# Patient Record
Sex: MALE | Race: WHITE | NOT HISPANIC OR LATINO | Employment: OTHER | URBAN - METROPOLITAN AREA
[De-identification: names, ages, dates, MRNs, and addresses within clinical notes are randomized per-mention and may not be internally consistent; named-entity substitution may affect disease eponyms.]

---

## 2021-10-19 ENCOUNTER — OFFICE VISIT (OUTPATIENT)
Dept: NEUROSURGERY | Facility: CLINIC | Age: 66
End: 2021-10-19

## 2021-10-19 VITALS
WEIGHT: 172 LBS | BODY MASS INDEX: 24.62 KG/M2 | SYSTOLIC BLOOD PRESSURE: 117 MMHG | HEART RATE: 61 BPM | DIASTOLIC BLOOD PRESSURE: 59 MMHG | HEIGHT: 70 IN

## 2021-10-19 DIAGNOSIS — M47.816 FACET ARTHRITIS OF LUMBAR REGION: ICD-10-CM

## 2021-10-19 DIAGNOSIS — M51.27 HERNIATION OF INTERVERTEBRAL DISC BETWEEN L5 AND S1: Primary | ICD-10-CM

## 2021-10-19 DIAGNOSIS — M54.42 CHRONIC MIDLINE LOW BACK PAIN WITH LEFT-SIDED SCIATICA: ICD-10-CM

## 2021-10-19 DIAGNOSIS — G89.29 CHRONIC MIDLINE LOW BACK PAIN WITH LEFT-SIDED SCIATICA: ICD-10-CM

## 2021-10-19 PROCEDURE — 99204 OFFICE O/P NEW MOD 45 MIN: CPT | Performed by: PHYSICIAN ASSISTANT

## 2021-10-19 RX ORDER — IBUPROFEN 200 MG
200 TABLET ORAL DAILY PRN
COMMUNITY
End: 2023-03-07

## 2021-10-19 NOTE — PROGRESS NOTES
"Chief Complaint  Back Pain (Low back pain, radiates down left leg)    Subjective          Demetri Pratt who is a 65 y.o. year old male who presents to [facility] for Evaluation of the Spine.     The patient complains of pain located in the Lumbar Spine.  Patients states the pain has been present for 2 months.  The pain came on gradually.  The pain scaled level is 8.  The pain does radiate. Dermatomes are located on left Lumbar at: below the knee and to the outer ankle, but does not go into toes..  The pain is Intermittent and described as sharp and aching.  The pain is worse at no particular time of day. Patient states Rest makes the pain better.  Patient states Prolonged Walking and Lifting makes the pain worse.    Associated Symptoms Include: Weakness, left knee  Conservative Interventions Include: NSAIDs that were not very effective.    Was this the result of an injury or accident?: No    History of Previous Spinal Surgery?: No     reports that he quit smoking about 2 years ago. He has a 75.00 pack-year smoking history. He has never used smokeless tobacco.    Review of Systems   Musculoskeletal: Positive for back pain and myalgias.   Neurological: Positive for numbness.        Objective   Vital Signs:   /59   Pulse 61   Ht 177.8 cm (70\")   Wt 78 kg (172 lb)   BMI 24.68 kg/m²       Physical Exam  Constitutional:       Appearance: Normal appearance. He is normal weight.   Pulmonary:      Effort: Pulmonary effort is normal.   Musculoskeletal:         General: Tenderness (TTP midline at L5-S1 area) present.      Comments: SLR on left caused pain behind knee   Neurological:      General: No focal deficit present.      Mental Status: He is alert and oriented to person, place, and time.      Sensory: No sensory deficit.      Motor: No weakness.      Deep Tendon Reflexes: Reflexes normal.   Psychiatric:         Mood and Affect: Mood normal.         Behavior: " Behavior normal.        Neurologic Exam     Mental Status   Oriented to person, place, and time.        Result Review     I have personally reviewed the lumbar MRI without contrast from September 16, 2021 which shows multilevel degenerative disc disease and facet arthritis worse at the L4-L5 and the L5-S1 disc spaces, there is nerve root compression on the left at L5-S1 causing mass-effect of the exiting left L5 and traversing S1 nerve roots.       Assessment and Plan    Diagnoses and all orders for this visit:    1. Herniation of intervertebral disc between L5 and S1 (Primary)  -     Ambulatory Referral to Physical Therapy Evaluate and treat; Heat, Electrotherapy; E-stim; Stretching (core strength), ROM, Strengthening    2. Facet arthritis of lumbar region  -     Ambulatory Referral to Physical Therapy Evaluate and treat; Heat, Electrotherapy; E-stim; Stretching (core strength), ROM, Strengthening    3. Chronic midline low back pain with left-sided sciatica  -     Ambulatory Referral to Physical Therapy Evaluate and treat; Heat, Electrotherapy; E-stim; Stretching (core strength), ROM, Strengthening    He may consider PT to assess benefit for his pain. He may consider neuropathic medication for his leg pain, but he reports back pain is his worse pain at this point. He could consider LESB at L5-S1 disc space to assess benefit for his pain.    I will refer him to PT today to focus on lower back and core strengthening.    The patient was counseled on basic recommendations for the reduction and prevention of back, neck, or spine pain in association with spinal disorders, including: cessation/avoidance of nicotine use, maintenance of a healthy BMI and weight, focusing on building/maintaining core strength through core exercise, and avoidance of activities which worsen the pain. Surgery for patients with multilevel degenerative disc disease is not typically successful, with the risks outweighing the benefit. The patient  will monitor for changes in symptoms and notify our clinic of these changes as needed.    Patient will follow-up in about 6 weeks to reassess after therapy.      Follow Up   No follow-ups on file.  Patient was given instructions and counseling regarding his condition or for health maintenance advice. Please see specific information pulled into the AVS if appropriate.

## 2021-12-02 ENCOUNTER — OFFICE VISIT (OUTPATIENT)
Dept: NEUROSURGERY | Facility: CLINIC | Age: 66
End: 2021-12-02

## 2021-12-02 VITALS
HEIGHT: 70 IN | WEIGHT: 174 LBS | BODY MASS INDEX: 24.91 KG/M2 | HEART RATE: 73 BPM | SYSTOLIC BLOOD PRESSURE: 136 MMHG | DIASTOLIC BLOOD PRESSURE: 64 MMHG

## 2021-12-02 DIAGNOSIS — M54.42 CHRONIC MIDLINE LOW BACK PAIN WITH LEFT-SIDED SCIATICA: ICD-10-CM

## 2021-12-02 DIAGNOSIS — G89.29 CHRONIC MIDLINE LOW BACK PAIN WITH LEFT-SIDED SCIATICA: ICD-10-CM

## 2021-12-02 DIAGNOSIS — M47.816 FACET ARTHRITIS OF LUMBAR REGION: ICD-10-CM

## 2021-12-02 DIAGNOSIS — M51.27 HERNIATION OF INTERVERTEBRAL DISC BETWEEN L5 AND S1: Primary | ICD-10-CM

## 2021-12-02 PROCEDURE — 99213 OFFICE O/P EST LOW 20 MIN: CPT | Performed by: PHYSICIAN ASSISTANT

## 2021-12-02 NOTE — PROGRESS NOTES
Patient being seen for today for Back Pain  .    Subjective    Demetri Pratt is a 65 y.o. male that presents with Back Pain  .    HPI  Previously: He was last seen on 10/19/2021 for complaints of low back pain into the left lower extremity, he was referred for physical therapy at that time, it was noted that he may consider lumbar epidural steroid blocks at L5-S1 which he deferred.    Today: He continues to have pain in low back and left lower extremity to the left ankle, with numbness in the bottom of the left foot. He report some pain starting in the right hip since last visit. He did attend 6 weeks of physical therapy and did not find this helpful. No other new complaints reported today.     reports that he quit smoking about 2 years ago. He has a 75.00 pack-year smoking history. He has never used smokeless tobacco.    Review of Systems   Musculoskeletal: Positive for back pain.   Neurological: Positive for numbness (in bottom of his left foot).       Objective   Vitals:    12/02/21 0829   BP: 136/64   Pulse: 73        Physical Exam     Result Review   None today.     Assessment and Plan {CC Problem List  Visit Diagnosis  ROS  Review (Popup)  [facility] Maintenance  Quality  BestPractice  Medications  SmartSets  SnapShot Encounters  Media :23}   Diagnoses and all orders for this visit:    1. Herniation of intervertebral disc between L5 and S1 (Primary)    2. Facet arthritis of lumbar region    3. Chronic midline low back pain with left-sided sciatica    He did not benefit from therapy.    He may benefit from L5-S1 LESB and would like referral for this now. I will refer to [facility] in [place] to consult for this.    The patient was counseled on basic recommendations for the reduction and prevention of back, neck, or spine pain in association with spinal disorders, including: cessation/avoidance of nicotine use, maintenance of a healthy BMI and weight, focusing on  building/maintaining core strength through core exercise, and avoidance of activities which worsen the pain. Surgery for patients with multilevel degenerative disc disease is not typically successful, with the risks outweighing the benefit. The patient will monitor for changes in symptoms and notify our clinic of these changes as needed.    He will follow-up here PRN for failure to improve or worsening symptoms.    Follow Up {Instructions Charge Capture  Follow-up Communications :23}   No follow-ups on file.

## 2022-03-01 ENCOUNTER — OFFICE VISIT (OUTPATIENT)
Dept: NEUROSURGERY | Facility: CLINIC | Age: 67
End: 2022-03-01

## 2022-03-01 VITALS
HEART RATE: 84 BPM | BODY MASS INDEX: 25.34 KG/M2 | WEIGHT: 177 LBS | HEIGHT: 70 IN | DIASTOLIC BLOOD PRESSURE: 79 MMHG | SYSTOLIC BLOOD PRESSURE: 138 MMHG

## 2022-03-01 DIAGNOSIS — M54.42 CHRONIC MIDLINE LOW BACK PAIN WITH LEFT-SIDED SCIATICA: ICD-10-CM

## 2022-03-01 DIAGNOSIS — G89.29 CHRONIC MIDLINE LOW BACK PAIN WITH LEFT-SIDED SCIATICA: ICD-10-CM

## 2022-03-01 DIAGNOSIS — M51.27 HERNIATION OF INTERVERTEBRAL DISC BETWEEN L5 AND S1: Primary | ICD-10-CM

## 2022-03-01 DIAGNOSIS — M47.816 FACET ARTHRITIS OF LUMBAR REGION: ICD-10-CM

## 2022-03-01 PROCEDURE — 99214 OFFICE O/P EST MOD 30 MIN: CPT | Performed by: PHYSICIAN ASSISTANT

## 2022-03-01 RX ORDER — DULOXETIN HYDROCHLORIDE 30 MG/1
30 CAPSULE, DELAYED RELEASE ORAL
Qty: 30 CAPSULE | Refills: 0 | Status: SHIPPED | OUTPATIENT
Start: 2022-03-01 | End: 2022-04-05

## 2022-03-01 NOTE — PROGRESS NOTES
Patient being seen for today for Back Pain  .    Subjective    Demetri Pratt is a 66 y.o. male that presents with Back Pain  .    HPI  Previously:Last seen on 12/20/2021 for complaints of low back pain extending the left lower extremity, he was referred for physical therapy and epidural steroid blocks.      Today: His pain is about the same. He had epidural injection at [facility] without benefit, they referred him back to consider surgery.     reports that he quit smoking about 2 years ago. He has a 75.00 pack-year smoking history. He has never used smokeless tobacco.    Review of Systems    Objective   Vitals:    03/01/22 1614   BP: 138/79   Pulse: 84        Physical Exam     Result Review   I have personally reviewed the lumbar MRI without contrast from September 16, 2021 which shows multilevel degenerative disc disease and facet arthritis worse at the L4-L5 and the L5-S1 disc spaces, there is nerve root compression on the left at L5-S1 causing mass-effect of the exiting left L5 and traversing S1 nerve roots.       Assessment and Plan {CC Problem List  Visit Diagnosis  ROS  Review (Popup)  [facility] Maintenance  Quality  BestPractice  Medications  SmartSets  SnapShot Encounters  Media :23}   Diagnoses and all orders for this visit:    1. Herniation of intervertebral disc between L5 and S1 (Primary)    2. Facet arthritis of lumbar region    3. Chronic midline low back pain with left-sided sciatica    He did discuss possible discectomy on left at L5-S1 with Dr. Sawant today. He would like to hold off on surgery for now.    He is going to try some Cymbalta 30 mg PO QHS to assess benefit for his pain.    He is going to follow-up in about 1 month to reassess and consider surgery.    Follow Up {Instructions Charge Capture  Follow-up Communications :23}   No follow-ups on file.

## 2022-03-31 DIAGNOSIS — G89.29 CHRONIC MIDLINE LOW BACK PAIN WITH LEFT-SIDED SCIATICA: ICD-10-CM

## 2022-03-31 DIAGNOSIS — M54.42 CHRONIC MIDLINE LOW BACK PAIN WITH LEFT-SIDED SCIATICA: ICD-10-CM

## 2022-04-05 RX ORDER — DULOXETIN HYDROCHLORIDE 30 MG/1
30 CAPSULE, DELAYED RELEASE ORAL
Qty: 30 CAPSULE | Refills: 0 | Status: SHIPPED | OUTPATIENT
Start: 2022-04-05 | End: 2023-02-28

## 2022-11-08 ENCOUNTER — OFFICE VISIT (OUTPATIENT)
Dept: ORTHOPEDIC SURGERY | Facility: CLINIC | Age: 67
End: 2022-11-08

## 2022-11-08 VITALS — BODY MASS INDEX: 25.34 KG/M2 | HEIGHT: 70 IN | WEIGHT: 177 LBS

## 2022-11-08 DIAGNOSIS — M70.72 BURSITIS OF LEFT HIP, UNSPECIFIED BURSA: ICD-10-CM

## 2022-11-08 DIAGNOSIS — M25.552 LEFT HIP PAIN: Primary | ICD-10-CM

## 2022-11-08 PROCEDURE — 99203 OFFICE O/P NEW LOW 30 MIN: CPT | Performed by: ORTHOPAEDIC SURGERY

## 2022-11-08 RX ORDER — DICLOFENAC SODIUM 75 MG/1
75 TABLET, DELAYED RELEASE ORAL 2 TIMES DAILY
Qty: 60 TABLET | Refills: 1 | Status: SHIPPED | OUTPATIENT
Start: 2022-11-08 | End: 2023-02-28

## 2022-11-08 NOTE — PROGRESS NOTES
"Chief Complaint  Initial Evaluation of the Left Hip     Subjective      Demetri Pratt presents to [facility] for initial evaluation of the left hip.  He reported pain has been going on for 3-6 months.  He reported that has been having work on his back and feels he needed to get his hip checked.  He reports ibuprofen needed daily.  The patient has had therapy on the hip at this time and that is non affective.     No Known Allergies     Social History     Socioeconomic History   • Marital status:    Tobacco Use   • Smoking status: Former     Packs/day: 1.50     Years: 50.00     Pack years: 75.00     Types: Cigarettes     Quit date: 9/19/2019     Years since quitting: 3.1   • Smokeless tobacco: Never   Vaping Use   • Vaping Use: Never used   Substance and Sexual Activity   • Alcohol use: Never        Review of Systems     Objective   Vital Signs:   Ht 177.8 cm (70\")   Wt 80.3 kg (177 lb)   BMI 25.40 kg/m²       Physical Exam  Constitutional:       Appearance: Normal appearance. Patient is well-developed and normal weight.   HENT:      Head: Normocephalic.      Right Ear: Hearing and external ear normal.      Left Ear: Hearing and external ear normal.      Nose: Nose normal.   Eyes:      Conjunctiva/sclera: Conjunctivae normal.   Cardiovascular:      Rate and Rhythm: Normal rate.   Pulmonary:      Effort: Pulmonary effort is normal.      Breath sounds: No wheezing or rales.   Abdominal:      Palpations: Abdomen is soft.      Tenderness: There is no abdominal tenderness.   Musculoskeletal:      Cervical back: Normal range of motion.   Skin:     Findings: No rash.   Neurological:      Mental Status: Patient is alert and oriented to person, place, and time.   Psychiatric:         Mood and Affect: Mood and affect normal.         Judgment: Judgment normal.       Ortho Exam      LEFT HIP No swelling. No skin discoloration or muscle atrophy. Dorsal pedal pulse 2+. Posterior " tibialis pulse is 2+. Good strength to hamstrings, quadriceps, dorsiflexors, and plantar flexors. Calf supple. Sensation intact. Neurovascular Intact. Full ROM.    Procedures      Imaging Results (Most Recent)     Procedure Component Value Units Date/Time    XR Hip With or Without Pelvis 2 - 3 View Left [[id number]] Resulted: 11/08/22 1323     Updated: 11/08/22 1323    Narrative:      X-Ray Report:  Left hip(s) X-Ray  Indication: Evaluation of left hip.   AP/Lateral view(s)  Findings: No acute osseous abnormality, no dislocation or fracture.   Prior studies available for comparison: No              Result Review :     X-Ray Report:  Left hip(s) X-Ray  Indication: Evaluation of left hip.   AP/Lateral view(s)  Findings: No acute osseous abnormality, no dislocation or fracture.   Prior studies available for comparison: No               Assessment and Plan     Diagnoses and all orders for this visit:    1. Left hip pain (Primary)  -     XR Hip With or Without Pelvis 2 - 3 View Left  -     diclofenac (VOLTAREN) 75 MG EC tablet; Take 1 tablet by mouth 2 (Two) Times a Day.  Dispense: 60 tablet; Refill: 1    2. Bursitis of left hip, unspecified bursa        Discussed conservative measures as exercises, anti-inflammatory and injection. The patient wants a prescription for anti-inflammatories and will call back to schedule if these are non effective to discuss further conservative measures.     Educated on risk of smoking. Discussed options for smoking cessation.  Call or return if worsening symptoms.    Follow Up     PRN      Patient was given instructions and counseling regarding his condition or for health maintenance advice. Please see specific information pulled into the AVS if appropriate.     Scribed for Lex Arnold MD by Kassidy Harris MA.  11/08/22   09:21 EST    I have personally performed the services described in this document as scribed by the above individual and it is both accurate and complete. Lex KIDD  MD [place] 11/09/22

## 2022-11-10 ENCOUNTER — PATIENT ROUNDING (BHMG ONLY) (OUTPATIENT)
Dept: ORTHOPEDIC SURGERY | Facility: CLINIC | Age: 67
End: 2022-11-10

## 2022-11-10 NOTE — PROGRESS NOTES
A Clik[employer] message has been sent to the patient for PATIENT ROUNDING with [facility].

## 2023-01-05 ENCOUNTER — TELEPHONE (OUTPATIENT)
Dept: SURGERY | Facility: CLINIC | Age: 68
End: 2023-01-05
Payer: MEDICARE

## 2023-01-05 NOTE — TELEPHONE ENCOUNTER
PT SELF REFERRED TO DR SOL, PT CX'D HIS APPT WITH DR SOL FOR 1/4/23, PER CX NOTES, \"PATIENT STATED THAT HE WILL GO BACK TO [facility] INSTEAD OF COMING TO [facility] FOR THIS ISSUE\" ANYTHING ELSE TO DO?

## 2023-01-06 NOTE — TELEPHONE ENCOUNTER
SPOKE TO PT AND HE STATED THAT HE HAS AN APPT WITH [facility] FOR THIS ISSUE AND DOESN'T NEED TO RS HIS APPT WITH DR SOL.

## 2023-02-28 ENCOUNTER — CONSULT (OUTPATIENT)
Dept: ONCOLOGY | Facility: HOSPITAL | Age: 68
End: 2023-02-28
Payer: MEDICARE

## 2023-02-28 ENCOUNTER — LAB (OUTPATIENT)
Dept: ONCOLOGY | Facility: HOSPITAL | Age: 68
End: 2023-02-28
Payer: MEDICARE

## 2023-02-28 VITALS
WEIGHT: 167.11 LBS | OXYGEN SATURATION: 98 % | TEMPERATURE: 98.8 F | HEIGHT: 70 IN | SYSTOLIC BLOOD PRESSURE: 148 MMHG | BODY MASS INDEX: 23.92 KG/M2 | RESPIRATION RATE: 18 BRPM | HEART RATE: 86 BPM | DIASTOLIC BLOOD PRESSURE: 77 MMHG

## 2023-02-28 DIAGNOSIS — C18.9 MALIGNANT NEOPLASM OF COLON, UNSPECIFIED PART OF COLON: Primary | ICD-10-CM

## 2023-02-28 DIAGNOSIS — C18.9 MALIGNANT NEOPLASM OF COLON, UNSPECIFIED PART OF COLON: ICD-10-CM

## 2023-02-28 DIAGNOSIS — C18.2 MALIGNANT NEOPLASM OF ASCENDING COLON: ICD-10-CM

## 2023-02-28 PROBLEM — M51.36 DEGENERATION OF LUMBAR INTERVERTEBRAL DISC: Status: ACTIVE | Noted: 2023-02-28

## 2023-02-28 PROBLEM — IMO0002 THORACIC OR LUMBOSACRAL NEURITIS OR RADICULITIS: Status: ACTIVE | Noted: 2017-01-25

## 2023-02-28 PROBLEM — M54.30 SCIATICA: Status: ACTIVE | Noted: 2017-01-25

## 2023-02-28 PROBLEM — M51.369 DEGENERATION OF LUMBAR INTERVERTEBRAL DISC: Status: ACTIVE | Noted: 2023-02-28

## 2023-02-28 PROBLEM — M54.50 LUMBAR BACK PAIN: Status: ACTIVE | Noted: 2023-02-28

## 2023-02-28 PROBLEM — K63.89 MASS OF CECUM: Status: ACTIVE | Noted: 2023-02-28

## 2023-02-28 PROBLEM — M47.817 LUMBOSACRAL SPONDYLOSIS WITHOUT MYELOPATHY: Status: ACTIVE | Noted: 2023-02-28

## 2023-02-28 LAB
ALBUMIN SERPL-MCNC: 4.5 G/DL (ref 3.5–5.2)
ALBUMIN/GLOB SERPL: 1.7 G/DL
ALP SERPL-CCNC: 79 U/L (ref 39–117)
ALT SERPL W P-5'-P-CCNC: 17 U/L (ref 1–41)
ANION GAP SERPL CALCULATED.3IONS-SCNC: 11.2 MMOL/L (ref 5–15)
AST SERPL-CCNC: 15 U/L (ref 1–40)
BASOPHILS # BLD AUTO: 0.09 10*3/MM3 (ref 0–0.2)
BASOPHILS NFR BLD AUTO: 0.9 % (ref 0–1.5)
BILIRUB SERPL-MCNC: 0.2 MG/DL (ref 0–1.2)
BUN SERPL-MCNC: 18 MG/DL (ref 8–23)
BUN/CREAT SERPL: 17.5 (ref 7–25)
CALCIUM SPEC-SCNC: 9.6 MG/DL (ref 8.6–10.5)
CHLORIDE SERPL-SCNC: 106 MMOL/L (ref 98–107)
CO2 SERPL-SCNC: 24.8 MMOL/L (ref 22–29)
CREAT SERPL-MCNC: 1.03 MG/DL (ref 0.76–1.27)
DEPRECATED RDW RBC AUTO: 55.6 FL (ref 37–54)
EGFRCR SERPLBLD CKD-EPI 2021: 79.1 ML/MIN/1.73
EOSINOPHIL # BLD AUTO: 0.53 10*3/MM3 (ref 0–0.4)
EOSINOPHIL NFR BLD AUTO: 5 % (ref 0.3–6.2)
ERYTHROCYTE [DISTWIDTH] IN BLOOD BY AUTOMATED COUNT: 17 % (ref 12.3–15.4)
GLOBULIN UR ELPH-MCNC: 2.7 GM/DL
GLUCOSE SERPL-MCNC: 84 MG/DL (ref 65–99)
HCT VFR BLD AUTO: 40.3 % (ref 37.5–51)
HGB BLD-MCNC: 12.9 G/DL (ref 13–17.7)
IMM GRANULOCYTES # BLD AUTO: 0.04 10*3/MM3 (ref 0–0.05)
IMM GRANULOCYTES NFR BLD AUTO: 0.4 % (ref 0–0.5)
LYMPHOCYTES # BLD AUTO: 2.48 10*3/MM3 (ref 0.7–3.1)
LYMPHOCYTES NFR BLD AUTO: 23.5 % (ref 19.6–45.3)
MCH RBC QN AUTO: 28.7 PG (ref 26.6–33)
MCHC RBC AUTO-ENTMCNC: 32 G/DL (ref 31.5–35.7)
MCV RBC AUTO: 89.8 FL (ref 79–97)
MONOCYTES # BLD AUTO: 1.02 10*3/MM3 (ref 0.1–0.9)
MONOCYTES NFR BLD AUTO: 9.7 % (ref 5–12)
NEUTROPHILS NFR BLD AUTO: 6.39 10*3/MM3 (ref 1.7–7)
NEUTROPHILS NFR BLD AUTO: 60.5 % (ref 42.7–76)
NRBC BLD AUTO-RTO: 0 /100 WBC (ref 0–0.2)
PLATELET # BLD AUTO: 263 10*3/MM3 (ref 140–450)
PMV BLD AUTO: 10.1 FL (ref 6–12)
POTASSIUM SERPL-SCNC: 3.9 MMOL/L (ref 3.5–5.2)
PROT SERPL-MCNC: 7.2 G/DL (ref 6–8.5)
RBC # BLD AUTO: 4.49 10*6/MM3 (ref 4.14–5.8)
SODIUM SERPL-SCNC: 142 MMOL/L (ref 136–145)
WBC NRBC COR # BLD: 10.55 10*3/MM3 (ref 3.4–10.8)

## 2023-02-28 PROCEDURE — 80053 COMPREHEN METABOLIC PANEL: CPT

## 2023-02-28 PROCEDURE — 36415 COLL VENOUS BLD VENIPUNCTURE: CPT

## 2023-02-28 PROCEDURE — G0463 HOSPITAL OUTPT CLINIC VISIT: HCPCS

## 2023-02-28 PROCEDURE — 99205 OFFICE O/P NEW HI 60 MIN: CPT | Performed by: INTERNAL MEDICINE

## 2023-02-28 PROCEDURE — 85025 COMPLETE CBC W/AUTO DIFF WBC: CPT

## 2023-02-28 RX ORDER — TRAMADOL HYDROCHLORIDE 50 MG/1
TABLET ORAL
COMMUNITY
Start: 2023-02-02 | End: 2023-03-07

## 2023-02-28 RX ORDER — GABAPENTIN 100 MG/1
CAPSULE ORAL
COMMUNITY
Start: 2023-02-02 | End: 2023-03-17

## 2023-02-28 RX ORDER — PROMETHAZINE HYDROCHLORIDE 12.5 MG/1
25 TABLET ORAL EVERY 6 HOURS PRN
Qty: 30 TABLET | Refills: 3 | Status: SHIPPED | OUTPATIENT
Start: 2023-02-28 | End: 2023-03-07

## 2023-02-28 RX ORDER — ONDANSETRON 4 MG/1
TABLET, FILM COATED ORAL
COMMUNITY
Start: 2023-02-06 | End: 2023-02-28

## 2023-02-28 RX ORDER — OXYCODONE HYDROCHLORIDE 5 MG/1
TABLET ORAL
COMMUNITY
Start: 2023-02-06 | End: 2023-03-07

## 2023-02-28 RX ORDER — METHOCARBAMOL 500 MG/1
TABLET, FILM COATED ORAL
COMMUNITY
Start: 2023-02-02 | End: 2023-03-17

## 2023-02-28 RX ORDER — ONDANSETRON HYDROCHLORIDE 8 MG/1
8 TABLET, FILM COATED ORAL 3 TIMES DAILY PRN
Qty: 30 TABLET | Refills: 5 | Status: SHIPPED | OUTPATIENT
Start: 2023-02-28

## 2023-02-28 NOTE — PROGRESS NOTES
Chief Complaint  Malignant Neoplasm of Cecum     Gi Schaeffer MD [place], Umer LÓPEZ MD    Subjective          Demetri Jamin Pratt presents to [facility] for right sided colon cancer    Mr. Pratt is a pleasant 67-year-old presenting with his wife for new oncology evaluation diagnosis of right-sided colon cancer.  He has a past medical history of AAA status postrepair.  And he is active.  He considers business.  He was having right-sided abdominal pain.  This was evaluated with a CT that showed a cecal mass.  He eventually underwent a right hemicolectomy at the [facility].  This was performed on January 30th of this year.  This showed a mass attached to abdominal wall. All 21 lymph nodes were negative for cancer.  Lymphovascular invasion and perineural invasion both present.  It was poorly differentiated.  He was staged pT4pN0.  He was referred to oncology for consideration of adjuvant chemotherapy.  He has recovered from surgery.  He did have significant pain after surgery but that is much improved.  He has regular bowel movements.  He denies any bleeding.  He denies any fevers, chills, infections.  He is having trouble gaining weight back. He is not interested in a port.     Oncology/Hematology History Overview Note   12/29/22: CTA of abdomen (prior history of AAA) demonstrated masslike circumferential wall thickening of the cecum and proximal ascending colon. CEA 4.2 (wnl)    1/30/23: Lap converted to open Right hemicolectomy with abdominal wall resection, omentectomy at the [facility]. 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  Tumor directly invades adjacent organ or structure: abdominal wall, no perforation, LVI and PNI both present.  All margins negative for invasive carcinoma. All 21 regional lymph nodes negative for tumor: Staged at pT4b  pN0.        Malignant neoplasm of ascending colon (HCC)   1/30/2023 Cancer Staged    Staging form: Colon And Rectum, AJCC 8th Edition  - Pathologic stage from 1/30/2023: Stage IIC (pT4b, pN0, cM0) - Signed by Clarence Rose MD on 2/28/2023 2/28/2023 Initial Diagnosis    Malignant neoplasm of ascending colon (HCC)     2/28/2023 -  Chemotherapy    OP COLORECTAL Capecitabine (Dose Selection Required) BID (8 cycles)           Review of Systems   Constitutional: Positive for fatigue.   All other systems reviewed and are negative.    Current Outpatient Medications on File Prior to Visit   Medication Sig Dispense Refill   • gabapentin (NEURONTIN) 100 MG capsule TAKE 1 CAPSULE BY MOUTH EVERY 8 HOURS FOR 14 DAYS     • ibuprofen (ADVIL,MOTRIN) 200 MG tablet Take 200 mg by mouth Daily As Needed for Mild Pain .     • methocarbamol (ROBAXIN) 500 MG tablet TAKE 2 TABLET BY MOUTH EVERY 8 HOURS X 14 DAYS AS NEEDED FOR MUSCLE SPASMS     • ondansetron (ZOFRAN) 4 MG tablet      • oxyCODONE (ROXICODONE) 5 MG immediate release tablet      • traMADol (ULTRAM) 50 MG tablet TAKE 1 TABLET BY MOUTH EVERY 4 HOURS AS NEEDED FOR SEVERE PAIN     • [DISCONTINUED] diclofenac (VOLTAREN) 75 MG EC tablet Take 1 tablet by mouth 2 (Two) Times a Day. 60 tablet 1   • [DISCONTINUED] DULoxetine (CYMBALTA) 30 MG capsule TAKE 1 CAPSULE BY MOUTH EVERY NIGHT AT BEDTIME 30 capsule 0     No current facility-administered medications on file prior to visit.       No Known Allergies  Past Medical History:   Diagnosis Date   • AAA (abdominal aortic aneurysm) without rupture      Past Surgical History:   Procedure Laterality Date   • ABDOMINAL AORTIC ANEURYSM REPAIR  2019   • VASECTOMY  1994     Social History     Socioeconomic History   • Marital status:    Tobacco Use   • Smoking status: Former     Packs/day: 1.50     Years: 50.00     Pack years: 75.00     Types: Cigarettes     Quit date: 9/19/2019     Years since quitting: 3.4   • Smokeless  "tobacco: Never   Vaping Use   • Vaping Use: Never used   Substance and Sexual Activity   • Alcohol use: Never     Family History   Problem Relation Age of Onset   • No Known Problems Other    • Diabetes Mother    • Cancer Father        Objective   Physical Exam  Well appearing patient in no acute distress on RA  Anicteric sclerae, no rash on exposed skin  Respirations non-labored  Awake, alert, and oriented x 4. Speech intact. No gross neurologic deficit  Appropriate mood and affect    Vitals:    02/28/23 1409   BP: 148/77   Pulse: 86   Resp: 18   Temp: 98.8 °F (37.1 °C)   TempSrc: Temporal   SpO2: 98%   Weight: 75.8 kg (167 lb 1.7 oz)   Height: 177.8 cm (70\")   PainSc: 0-No pain               PHQ-9 Total Score:                      Result Review :   The following data was reviewed by: Clarence Rose MD on 02/28/23:  Lab Results   Component Value Date    NEUTROABS 9.4 (H) 01/30/2023    EOSABS 0.2 01/30/2023    BASOSABS 0.1 01/30/2023     No results found for: GLUCOSE, BUN, CREATININE, NA, K, CL, CO2, CALCIUM, PROTEINTOT, ALBUMIN, BILITOT, ALKPHOS, AST, ALT  No results found for: MG, PHOS, FREET4, TSH       Outside labs personally reviewed.   CEA level personally reviewed: 4.2    [facility] surgery outpatient documentation personally  reviewed.     No radiology results for the last 30 days.     Pathology   SYNOPTIC REPORT:   CASE SUMMARY: (COLON AND RECTUM: Resection)   Standard(s) : AJCC-UICC 8   Procedure:  Right hemicolectomy with abdominal wall resection, omentectomy   Macroscopic Evaluation of Mesorectum:   Not applicable   Tumor Site:  Cecum                     Mucinous adenocarcinoma   Histologic Type:     Histologic Type Comment:  Extensive signet ring cell features   Histologic Grade:  G3, poorly differentiated   Tumor Size:  Greatest dimension: 6.5 cm   Multiple Primary Sites:   Not applicable   Tumor Extent:  Tumor directly invades adjacent organ or structure: abdominal wall "   Macroscopic Tumor Perforation:   Not identified   Lymphovascular Invasion:   Present: Large vessel (venous), extramural   Perineural Invasion:   Present   Treatment Effect:  No known presurgical therapy   Margin Status for Invasive Carcinoma:   All margins negative for invasive carcinoma      Distance from Invasive Carcinoma to Radial (Circumferential) Margin:    Not applicable   Margin Status for Non-Invasive Tumor:   All margins negative for high-grade dysplasia /   intramucosal carcinoma and low-grade dysplasia   Regional Lymph Node Status:   All regional lymph nodes negative for tumor     Number of Lymph Nodes Examined:  21   Tumor Deposits:  Not identified   Distant Site(s) Involved:   Not applicable   PATHOLOGIC STAGE CLASSIFICATION (pTNM, AJCC 8th Edition):    pT4b pN0   SPECIAL STUDIES:  Pending mismatch repair protein immunohistochemistry for addendum           Assessment and Plan    Diagnoses and all orders for this visit:    1. Malignant neoplasm of colon, unspecified part of colon (HCC) (Primary)  -     Ambulatory Referral to ONC Social Work  -     CBC & Differential; Future  -     Comprehensive Metabolic Panel; Future  -     CT chest w contrast; Future  -     CT abdomen pelvis w contrast; Future  -     CBC & Differential; Future  -     Comprehensive Metabolic Panel; Future    2. Malignant neoplasm of ascending colon (HCC)  -     ondansetron (ZOFRAN) 8 MG tablet; Take 1 tablet by mouth 3 (Three) Times a Day As Needed for Nausea or Vomiting.  Dispense: 30 tablet; Refill: 5    Other orders  -     promethazine (PHENERGAN) 12.5 MG tablet; Take 2 tablets by mouth Every 6 (Six) Hours As Needed for Nausea or Vomiting.  Dispense: 30 tablet; Refill: 3    Stage II colon adenocarcinoma    Mr Pratt has high risk stage II colon adenocarcinoma s/p right hemicolectomy on 1/30/23 with pathology showing qH6idO5 with LVI and PNI. 0/21 lymph nodes involved. G3, poorly differentiated. Baseline CEA negative. Will  obtain baseline CT imaging, which is ordered.  Discussed that because patient has high risk stage II colon cancer with the T4 stage as well LVI and PNI, I strongly recommend adjuvant chemotherapy per NCCN guidelines.  Because of his age, he would be recommended to have 6 months of combination of IV 5-FU or oral capecitabine with IV oxaliplatin.  Discussed that this gives him the best chance of cure. Patient is not interested in getting a port and is only interested in oral chemotherapy.  For this reason, I will recommend oral capecitabine alone. This will be dosed at 1250 mg/m2 twice daily on days 1-14 of a 21-day treatment cycle for maximum of 8 cycles.  We will get a baseline ctDNA with [facility] today and plan to continue to monitor this while on treatment.  We can consider stopping chemo after 3 months of treatment if the tear remains negative. Labs to be obtained today. Risk, benefit, side effect profile,  was discussed in detail today. Discussed that dosing of chemotherapy will be based on his tolerability.  Consent obtained. PRN anti-emetics prescribed.    Plan to see patient after first cycle for toxicity check.     This is an acute or chronic illness that poses a threat to life or bodily function. The above treatment plan involves a high risk of complications and/or mortality of patient management.    The patient was seen and examined. Work by the provider also included review and/or ordering of lab tests, review and/or ordering of radiology tests, review and/or ordering of medicine tests, discussion with other physicians or providers, independent review of data, obtaining old records, review/summation of old records, and/or other review.     I have reviewed the family history, social history, and past medical history for this patient. Previous information and data has been reviewed and updated as needed. I have reviewed and verified the chief complaint, history, and other documentation. The patient was  interviewed and examined at the bedside and the chart reviewed. The previous observations, recommendations, and conclusions were reviewed including those of other providers.     The plan was discussed with the patient and/or family. The patient was given time to ask questions and these questions were answered. At the conclusion of their visit they had no additional questions or concerns and all questions were answered to their satisfaction.            Patient Follow Up: 3 weeks after starting Capecitabine for toxicity check  Patient was given instructions and counseling regarding his condition or for health maintenance advice. Please see specific information pulled into the AVS if appropriate.

## 2023-03-01 ENCOUNTER — SPECIALTY PHARMACY (OUTPATIENT)
Dept: PHARMACY | Facility: HOSPITAL | Age: 68
End: 2023-03-01
Payer: MEDICARE

## 2023-03-01 DIAGNOSIS — C18.2 MALIGNANT NEOPLASM OF ASCENDING COLON: Primary | ICD-10-CM

## 2023-03-01 RX ORDER — CAPECITABINE 500 MG/1
2000 TABLET, FILM COATED ORAL 2 TIMES DAILY
Qty: 112 TABLET | Refills: 7 | Status: SHIPPED | OUTPATIENT
Start: 2023-03-01 | End: 2023-03-20 | Stop reason: SDUPTHER

## 2023-03-02 ENCOUNTER — SPECIALTY PHARMACY (OUTPATIENT)
Dept: PHARMACY | Facility: HOSPITAL | Age: 68
End: 2023-03-02
Payer: MEDICARE

## 2023-03-02 NOTE — PROGRESS NOTES
Oral Chemotherapy - New Referral    Received a referral from Dr. Rose    Treatment Plan: Xeloda (capecitabine)  Start date of treatment planned for: As soon as oral specialty medication is available.  Indication: Colorectal  Relevant past treatments: surgery  Is the therapy appropriate based on treatment guidelines and FDA labeling?: yes  Therapeutic Goals: 8 cycles  Patient can self-administer oral medications: Yes    • Drug-Drug Interactions: The current medication list was reviewed and there are no relevant drug-drug interactions.  • Medication Allergies: The patient has NKDA  • Review of Labs/Dose Adjustments: The patient's most recent labs were reviewed and are appropriate to start treatment at this dose    A prescription was released to [facility] specialty pharmacy for   Drug: Xeloda (capecitabine)  Strength: 500 mg  Directions: Take 4 tablets by mouth twice a day ON days 1-14, OFF for 7 days of each 21 day cycle  Quantity: 112  Refills: 7    Pharmacy education is scheduled for 3/7/23. and CCA and consent will be signed at that time.    Anisha West, PharmD, [facility]  Oncology Clinical Pharmacist  3/2/2023  10:15 EST

## 2023-03-03 ENCOUNTER — PATIENT ROUNDING (BHMG ONLY) (OUTPATIENT)
Dept: ONCOLOGY | Facility: HOSPITAL | Age: 68
End: 2023-03-03
Payer: MEDICARE

## 2023-03-03 NOTE — PROGRESS NOTES
March 3, 2023    Hello, may I speak with Demetri Pratt?    My name is Shannan.    I am  with [facility]  [address] [place], [place] [address]  [place] [address] [phone].    Before we get started may I verify your date of birth? 1955    I am calling to officially welcome you to our practice and ask about your recent visit. Is this a good time to talk? YES    Tell me about your visit with us. What things went well?      I spoke to the patient and he stated that the visit went well. He stated they we had some of the nicest staff. I verified with one of our nurses when the patient is supposed to start his oral chemo. No other questions or concerns at this time.     We're always looking for ways to make our patients' experiences even better. Do you have recommendations on ways we may improve?  NO    Overall were you satisfied with your first visit to our practice? YES       I appreciate you taking the time to speak with me today. Is there anything else I can do for you? NO      Thank you, and have a great day.

## 2023-03-06 ENCOUNTER — TELEPHONE (OUTPATIENT)
Dept: ONCOLOGY | Facility: HOSPITAL | Age: 68
End: 2023-03-06
Payer: MEDICARE

## 2023-03-06 DIAGNOSIS — C18.2 MALIGNANT NEOPLASM OF ASCENDING COLON: Primary | ICD-10-CM

## 2023-03-06 NOTE — TELEPHONE ENCOUNTER
This nurse called the pt and made him aware of the Genetic Counseling order. The pt voiced understanding.

## 2023-03-06 NOTE — TELEPHONE ENCOUNTER
The pt called and has questions about Family Genetics and Genetic testing. The pt had a son who went through cancer and the pt is questioning if he will have to go through the same thing and if genetic testing could answer that for him.

## 2023-03-07 ENCOUNTER — SPECIALTY PHARMACY (OUTPATIENT)
Dept: PHARMACY | Facility: HOSPITAL | Age: 68
End: 2023-03-07
Payer: MEDICARE

## 2023-03-07 NOTE — PROGRESS NOTES
Specialty Pharmacy Patient Management Program  Oncology Initial Assessment       A 67 y.o. male with colon cancer was seen by an Oncology provider and enrolled in the Oncology Patient Management program offered by [facility].  An initial outreach was conducted, including assessment of therapy appropriateness and specialty medication education for Xeloda (capecitabine). The patient was introduced to services offered by [facility], including: regular assessments, refill coordination, curbside pick-up or mail order delivery options, prior authorization maintenance, and financial assistance programs as applicable. The patient was also provided with contact information for the pharmacy team.     Regimen: Capecitabine 1500 mg bid on days 1-14, then off for 7 days    Start date of oral specialty medication: 3/11/23    Relevant Past Medical History, Comorbidities, and Vaccines  Relevant medical history and concomitant health conditions were discussed with the patient. The patient's chart has been reviewed for relevant past medical history and comorbid health conditions and updated as necessary.  Vaccines are coordinated by the patient's oncologist and primary care provider.  Past Medical History:   Diagnosis Date   • AAA (abdominal aortic aneurysm) without rupture      Social History     Socioeconomic History   • Marital status:    Tobacco Use   • Smoking status: Former     Packs/day: 1.50     Years: 50.00     Pack years: 75.00     Types: Cigarettes     Quit date: 9/19/2019     Years since quitting: 3.4   • Smokeless tobacco: Never   Vaping Use   • Vaping Use: Never used   Substance and Sexual Activity   • Alcohol use: Never       Allergies  Known allergies and reactions were discussed with the patient. The patient's chart has been reviewed for allergy information and updated as necessary.   No Known Allergies     Current Medication List  This medication list has been  reviewed with the patient and evaluated for any interactions or necessary modifications/recommendations, and updated to include all prescription medications, OTC medications, and supplements the patient is currently taking.  This list reflects what is contained in the patient's profile, which has also been marked as reviewed to communicate to other providers it is the most up to date version of the patient's current medication therapy.   Prior to Admission medications    Medication Sig Start Date End Date Taking? Authorizing Provider   capecitabine (XELODA) 500 MG chemo tablet Take 4 tablets by mouth 2 (Two) Times a Day. Take 4 tab(s) by mouth in the AM and 4 tab(s) by mouth in the PM on days 1-14, then off for 7 days of a 21 day cycle. 3/1/23  Yes Clarence Rose MD   ondansetron (ZOFRAN) 8 MG tablet Take 1 tablet by mouth 3 (Three) Times a Day As Needed for Nausea or Vomiting. 2/28/23  Yes Clarence Rose MD   gabapentin (NEURONTIN) 100 MG capsule TAKE 1 CAPSULE BY MOUTH EVERY 8 HOURS FOR 14 DAYS 2/2/23   Seth Woodall MD   methocarbamol (ROBAXIN) 500 MG tablet TAKE 2 TABLET BY MOUTH EVERY 8 HOURS X 14 DAYS AS NEEDED FOR MUSCLE SPASMS 2/2/23   Seth Woodall MD   diclofenac (VOLTAREN) 75 MG EC tablet Take 1 tablet by mouth 2 (Two) Times a Day. 11/8/22 2/28/23  Lex Arnold MD   DULoxetine (CYMBALTA) 30 MG capsule TAKE 1 CAPSULE BY MOUTH EVERY NIGHT AT BEDTIME 4/5/22 2/28/23  Kenneth Fernandez PA-C   ibuprofen (ADVIL,MOTRIN) 200 MG tablet Take 1 tablet by mouth Daily As Needed for Mild Pain.  3/7/23  Seth Woodall MD   ondansetron (ZOFRAN) 4 MG tablet  2/6/23 2/28/23  Seth Woodall MD   oxyCODONE (ROXICODONE) 5 MG immediate release tablet  2/6/23 3/7/23  Seth Woodall MD   promethazine (PHENERGAN) 12.5 MG tablet Take 2 tablets by mouth Every 6 (Six) Hours As Needed for Nausea or Vomiting. 2/28/23 3/7/23  Clarence Rose MD   traMADol (ULTRAM) 50  MG tablet TAKE 1 TABLET BY MOUTH EVERY 4 HOURS AS NEEDED FOR SEVERE PAIN 2/2/23 3/7/23  Provider, MD Seth       Drug Interactions  • Reviewed concomitant medications, allergies, labs, comorbidities/medical history, quality of life, and immunization history.   • Drug-drug interactions noted and discussed during education: no significant drug interactions noted. . Reminded the patient to let us know before making any changes or starting any new prescription or OTC medications so we can first assess drug interactions.    Relevant Laboratory Values  Lab Results   Component Value Date    GLUCOSE 84 02/28/2023    CALCIUM 9.6 02/28/2023     02/28/2023    K 3.9 02/28/2023    CO2 24.8 02/28/2023     02/28/2023    BUN 18 02/28/2023    CREATININE 1.03 02/28/2023    BCR 17.5 02/28/2023    ANIONGAP 11.2 02/28/2023     Lab Results   Component Value Date    WBC 10.55 02/28/2023    RBC 4.49 02/28/2023    HGB 12.9 (L) 02/28/2023    HCT 40.3 02/28/2023    MCV 89.8 02/28/2023    MCH 28.7 02/28/2023    MCHC 32.0 02/28/2023    RDW 17.0 (H) 02/28/2023    RDWSD 55.6 (H) 02/28/2023    MPV 10.1 02/28/2023     02/28/2023    NEUTRORELPCT 60.5 02/28/2023    LYMPHORELPCT 23.5 02/28/2023    MONORELPCT 9.7 02/28/2023    EOSRELPCT 5.0 02/28/2023    BASORELPCT 0.9 02/28/2023    AUTOIGPER 0.4 02/28/2023    NEUTROABS 6.39 02/28/2023    LYMPHSABS 2.48 02/28/2023    MONOSABS 1.02 (H) 02/28/2023    EOSABS 0.53 (H) 02/28/2023    BASOSABS 0.09 02/28/2023    AUTOIGNUM 0.04 02/28/2023    NRBC 0.0 02/28/2023       The above labs have been reviewed. The following labs are outside of normal limits: Hgb is slightly low No dose adjustments are needed for the oral specialty medication(s) based on the labs.    Initial Education Provided for Specialty Medication  The patient has been provided with the following education. All questions and concerns have been addressed prior to the patient receiving the medication, and the patient has  verbalized understanding of the education and any materials provided.  Additional patient education shall be provided and documented upon request by the patient, provider or payer.      Provided patient with:   Chemo calendar to help improve adherence., Education sheets about the medication, 24-hour clinic phone number and my contact information and instructions to call should additional questions arise.     Medication Education Sheets Provided: (select all that apply)  • Oral Specialty Medication: Xeloda (capecitabine)    Other Education Sheets Provided: (select all that apply)  CINV, Diarrhea and Hand-Foot Syndrome    TOPICS COMMENTS   Storage and Handling of Oral Specialty Medication Store in the original container, in a dry location out of direct sunlight, and out of reach of children or pets. and Store at room temperature.  Discussed safe handling and what to do with any unused medication.   Administration of Oral Specialty Medication Take with food at the same time(s) each day., Take with a full glass of water. and Do not crush or chew tablets.   Adherence to Oral Specialty Regimen and Handling Missed Doses Patient is likely to have good treatment adherence; reinforced the importance of adherence. Reviewed how to address missed doses and to let us know of any missed doses.   Anemia: role of RBC, cause, s/s, ways to manage, role of transfusion Reviewed the role of RBC and the use of transfusions if hemoglobin decreases too much.  Patient to notify us if they experience shortness of breath, dizziness, or palpitations.  Also let patient know they could feel more tired than usual and to try to stay active, but rest if they need to.    Thrombocytopenia: role of platelet, cause, s/s, ways to prevent bleeding, things to avoid, when to seek help Reviewed the role of platelets in blood clotting and when to call clinic (bloody nose that bleeds for 5 mins despite pressure, a cut that won't stop bleeding despite pressure,  gums that bleed excessively with brushing or flossing). Recommended using an electric razor, soft bristle toothbrush, and blowing your nose gently.    Neutropenia: role of WBC, cause, infection precautions, s/s of infection, when to call MD Reviewed the role of WBC, good infection prevention practices, and when to call the clinic (temperature 100.4F, sore throat, burning urination, etc)     Nutrition and Appetite Changes:  importance of maintaining healthy diet & weight, ways to manage to improve intake, dietary consult, exercise regimen, electrolyte and/or blood glucose abnormalities Discussed risk of decreased appetite. Recommended eating smaller, more frequent meals. Instructed the patient to contact clinic if they were losing weight or having difficulty eating enough to maintain their energy level.   Diarrhea: causes, s/s of dehydration, ways to manage, dietary changes, when to call MD Chemotherapy - Discussed risk of diarrhea. Instructed patient that they can use OTC loperamide at first presentation of diarrhea, but call MD if 4-6 episodes in 24 hours not relieved by OTC loperamide.   Constipation: causes, ways to manage, dietary changes, when to call MD Provided supplementary handout with instructions for use of docusate and other OTC therapies to manage constipation.  Instructed to call us if medications aren't working.   Nausea/Vomiting: cause, use of antiemetics, dietary changes, when to call MD • Emetic risk: Low-Minimal  • PRN home meds: Ondansetron    Instructed the patient to take a dose of the PRN medication at the first onset of nausea and if it's not working to call us for additional medications.  Also provided non-drug measures to mitigate nausea.   Mouth Sores: causes, oral care, ways to manage Mouth sores can be prevented by making a mouth wash mixture of salt, baking soda, and water. The patient was instructed to swish and spit four times daily after meals and before bedtime.  Use of a soft  bristle toothbrush was recommended.  The patient was instructed to avoid alcohol-containing OTC mouthwashes.    Alopecia: cause, ways to manage, resources Discussed the possibility of hair loss with the patient. Informed patient that they could request a prescription for a wig if desired and most of the cost is usually covered by insurance. Recommended covering the head with a hat and/or protecting the skin on the head with SPF 30 or higher.    Nervous System Changes: causes, s/s, neuropathies, cognitive changes, ways to manage discussed the adverse effect of peripheral neuropathy and signs/symptoms associated with this adverse effect. Instructed patient to call if symptoms become more frequent or worsen.    Pain: causes, ways to manage Chemo - Discussed muscle and joint aches/pains with chemotherapy, and recommended the use of OTC pain relief with ibuprofen or acetaminophen if needed.   Skin/Nail Changes: cause, s/s, ways to manage Hand-foot syndrome was discussed, including the s/s, prevention measures, and treatment measures. A supplementary handout with this information was also given to the patient.        Organ Toxicities: cause, s/s, need for diagnostic tests, labs, when to notify MD Discussed potential effects on organ systems, monitoring, diagnostic tests, labs, and when to notify their MD. Discussed the signs/symptoms of the following: cardiotoxicity, hepatotoxicity and nephrotoxicity   Infertility and Sexuality:  causes, fertility preservation options, sexuality changes, ways to manage, importance of birth control Oral Oncology Therapy: Reviewed safe sex practices and minimizing exposure to body fluids while on oral oncology therapy.   Home Care: how to manage bodily fluids Counseled on management of soiled linens and proper flush technique.  Discussed how to manage all the side effects at home and advised when to contact the MD office     Adherence and Self-Administration  • Barriers to Patient Adherence  and/or Self-Administration: none  • Methods for Supporting Patient Adherence and/or Self-Administration: dosing calendar  • Expected duration of therapy: 8 planned cycles    Goals of Therapy  • Patient Goals of Therapy:   o Consistently take medications as prescribed  o Patient will adhere to medication regimen  o Patient will report any medication side effects to healthcare provider  • Clinical Goals:    Goals    None       o Support patient understanding of medication regimen  o Ensure patient knows the pharmacy contact information  o Schedule regular follow-up to monitor the treatment serious adverse events  o Schedule regular follow-up to confirm medication adherence  o Schedule regular follow-up to monitor disease progression or stability    Quality of Life Assessment   The patient's current (pre-therapy) quality of life was discussed with the patient. The QOL segment of this outreach has been reviewed and updated.   • Quality of Life Score: 8/10    Reassessment Plan & Follow-Up  1. Pharmacist to perform regular reassessments no more than (6) months from the previous assessment.  2. Welcome information and patient satisfaction survey to be sent by retail team with patient's initial fill.  3. Care Coordinator to set up future refill outreaches, coordinate prescription delivery, and escalate clinical questions to pharmacist.     Additional Plans, Therapy Recommendations or Therapy Problems to Be Addressed: none at this time     Attestation  I attest that the initiated specialty medication(s) are appropriate for the patient based on my assessment.  If the prescribed therapy is at any point deemed not appropriate based on the current or future assessments, a consultation will be initiated with the patient's specialty care provider to determine the best course of action. The revised plan of therapy will be documented along with any additional patient education provided.       Anisha West, Lou, BCPS  Oncology  Clinical Pharmacist  3/7/2023  15:31 EST

## 2023-03-08 ENCOUNTER — TELEPHONE (OUTPATIENT)
Dept: ONCOLOGY | Facility: HOSPITAL | Age: 68
End: 2023-03-08
Payer: MEDICARE

## 2023-03-08 NOTE — TELEPHONE ENCOUNTER
Distress Screening Follow-Up    Date of Distress Screenin23    Location of Distress Screening: Med onc    Distress Level: 5    Problems Indicated: Sleep, fatigue, changes in eating    C-SSRS: Denied SI and screened negative    Diagnosis: Colon cancer    Current Tx Plan: Mr. Pratt underwent a right hemicolectomy at [facility] on 23 and recently initiated oral chemotherapy, Capecitabine (Xeloda). Medical oncologist also recommended IV chemotherapy in addition to oral, however, pt had declined and opted to proceed with oral chemotherapy only.     Previous Cancer Tx (if applicable):  No history noted    Mood: Pt was very pleasant, easily engaged in conversation via telephone and able to effectively communicate his thoughts and feelings. Pt reports if he ever gets to the point where he is significantly debilitated and would require additional support, he would discontinue his cancer treatment. Provided emotional support throughout our conversation, utilizing empathy and validating and normalizing identified emotions. Discussed opportunity to get pt connected to outpatient mental health services (counseling,psych) and/or cancer support services ([id number] support, support groups) if interested. Pt did not express interest in referral(s) at this time.    Current or Past Mental Health Tx: No history noted    Support System: Pt receives support from his ex-spouse/significant other. When asked if pt has additional support, such as children, family or friends, he confirms he does but did not elaborate specifically who his support system includes.     Residential status/Who lives in the home: Pt resides in home with ex-spouse/significant other in [facility] Care Needs: No home care needs identified at this time. Pt is independent with his ADLs.    Insurance: Medicare     Finances: Pt is self-employed, doing construction, and receives social security halfway. Pt reports he works heavy [employer] as  long as it's not raining. Pt denies any financial concerns with affording medical expenses and/or basic needs.     Transportation: Pt provides his own transportation to appointments and denies any concerns with affording travel expenses    Advance Care Planning: No directive on file    Resources/Referrals: No needs identified at this time    Additional Comment: OSW contacted pt via telephone to f/u in regards to identified distress, introduce OSW role and assess for psychosocial needs. Pt identifies no specific needs at this time. Encouraged OSW support remains available and instructed how he may reach OSW in the future if needed. Pt v/u and expressed gratitude.

## 2023-03-13 ENCOUNTER — TELEPHONE (OUTPATIENT)
Dept: ONCOLOGY | Facility: HOSPITAL | Age: 68
End: 2023-03-13

## 2023-03-13 ENCOUNTER — SPECIALTY PHARMACY (OUTPATIENT)
Dept: PHARMACY | Facility: HOSPITAL | Age: 68
End: 2023-03-13
Payer: MEDICARE

## 2023-03-13 NOTE — TELEPHONE ENCOUNTER
[facility] called and states that they can not compound the mouthwash. They state that they have a mouth wash kit but it does not include the Nystatin.

## 2023-03-13 NOTE — TELEPHONE ENCOUNTER
The pt called and c/o HA, ST, mouth/gum pain. The pt states that he started Capecitabine this weekend and his s/s started after starting the new medication. When questioned the pt states that his gums ache and feel like they are on fire. The pt states that the gina is starting to run down his throat. When questioned the pt denies any Oral Lesions or fever. When questioned about his HA the pt states that he had been taking Tylenol w/o any relief. The pt is asking what he can do to alleviate his s/s.

## 2023-03-14 DIAGNOSIS — K12.30 STOMATITIS AND MUCOSITIS: Primary | ICD-10-CM

## 2023-03-14 DIAGNOSIS — K12.1 STOMATITIS AND MUCOSITIS: Primary | ICD-10-CM

## 2023-03-14 NOTE — TELEPHONE ENCOUNTER
----- Message from Nataliia West RPH sent at 3/14/2023 12:30 PM EDT -----  Please see Elvie's note below. Do you want to send in some magic mouthwash for him? I'll call him and give him some tips on managing the taste changes.    ----- Message -----  From: Elvie Carbajal  Sent: 3/13/2023   2:45 PM EDT  To: Nataliia West RPH    Called to set delivery. Pt stated he is having some issues with the Xeloda. He has a headache, a vidhya taste in his mouth. Gums are burning and achy. He said that same feeling he could feel go down his throat into his chest.  I told him I would pass along the info before we set delivery for his next cycle. Let me know what I need to do.

## 2023-03-16 NOTE — TELEPHONE ENCOUNTER
Called patient to inform him that we had sent a prescription for magic mouthwash to his pharmacy, but they are unable to fill, I was going to send the prescription to [facility] to see if he would have any issues going to [facility] to  the medicine. Patient stated that he no longer needed the medicine, because he stopped taking the medication and would not be taking any more chemo.  I advised the patient that of course it was his choice, but did he mind to tell me why he stopped the pills. Patient stated that he did not want to be sick and that it was affecting his quality of life, reported that he took the medicine for 2 days and had a headache was nauseous, his mouth and tongue were burning and the pain radiated down into his chest. So he stopped his medications. I asked patient if I could move his f/u visit from next Friday to tomorrow so that he could discuss his symptoms with Dr. Rose he agreed.

## 2023-03-17 ENCOUNTER — OFFICE VISIT (OUTPATIENT)
Dept: ONCOLOGY | Facility: HOSPITAL | Age: 68
End: 2023-03-17
Payer: MEDICARE

## 2023-03-17 ENCOUNTER — SPECIALTY PHARMACY (OUTPATIENT)
Dept: PHARMACY | Facility: HOSPITAL | Age: 68
End: 2023-03-17
Payer: MEDICARE

## 2023-03-17 VITALS
WEIGHT: 171.96 LBS | DIASTOLIC BLOOD PRESSURE: 80 MMHG | OXYGEN SATURATION: 97 % | BODY MASS INDEX: 24.67 KG/M2 | SYSTOLIC BLOOD PRESSURE: 150 MMHG | HEART RATE: 77 BPM | RESPIRATION RATE: 18 BRPM | TEMPERATURE: 97.5 F

## 2023-03-17 DIAGNOSIS — C18.2 MALIGNANT NEOPLASM OF ASCENDING COLON: ICD-10-CM

## 2023-03-17 DIAGNOSIS — K12.1 STOMATITIS AND MUCOSITIS: Primary | ICD-10-CM

## 2023-03-17 DIAGNOSIS — K12.30 STOMATITIS AND MUCOSITIS: Primary | ICD-10-CM

## 2023-03-17 PROCEDURE — 1160F RVW MEDS BY RX/DR IN RCRD: CPT | Performed by: INTERNAL MEDICINE

## 2023-03-17 PROCEDURE — G0463 HOSPITAL OUTPT CLINIC VISIT: HCPCS | Performed by: INTERNAL MEDICINE

## 2023-03-17 PROCEDURE — 99214 OFFICE O/P EST MOD 30 MIN: CPT | Performed by: INTERNAL MEDICINE

## 2023-03-17 PROCEDURE — 1126F AMNT PAIN NOTED NONE PRSNT: CPT | Performed by: INTERNAL MEDICINE

## 2023-03-17 PROCEDURE — 1159F MED LIST DOCD IN RCRD: CPT | Performed by: INTERNAL MEDICINE

## 2023-03-17 NOTE — PROGRESS NOTES
Chief Complaint  No chief complaint on file.    Umer Antonio MD Preston, Steven H, MD    Subjective          Demetri Pratt presents to [facility] HEMATOLOGY & ONCOLOGY for right sided colon cancer    Mr. Pratt is a pleasant 67-year-old presenting with his wife for new oncology evaluation diagnosis of right-sided colon cancer.  He has a past medical history of AAA status postrepair.  He started adjuvant capecitabine alone 2000 mg in AM and 2000 mg in PM on 3/11/23 hwoever after 2 days on the medicine he developed significant headache as well as gum sensitivity and throat and chest burning type pain. No mouth sores or blisters and no nausea. No fevers or chills. He stopped medication after 2 days.     Oncology/Hematology History Overview Note   12/29/22: CTA of abdomen (prior history of AAA) demonstrated masslike circumferential wall thickening of the cecum and proximal ascending colon. CEA 4.2 (wnl)    1/30/23: Lap converted to open Right hemicolectomy with abdominal wall resection, omentectomy at the [facility]. 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  Tumor directly invades adjacent organ or structure: abdominal wall, no perforation, LVI and PNI both present.  All margins negative for invasive carcinoma. All 21 regional lymph nodes negative for tumor: Staged at pT4b pN0.     3/11/23: C1D1 Capecitabine dosed at 2000 mg in AM and 2000 mg in PM. Complicated by headache and gum and throat pain. Decrease to 1500 mg AM and 1500 mg PM on 3/17/23.        Malignant neoplasm of ascending colon (HCC)   1/30/2023 Cancer Staged    Staging form: Colon And Rectum, AJCC 8th Edition  - Pathologic stage from 1/30/2023: Stage IIC (pT4b, pN0, cM0) - Signed by Clarence Rose MD on 2/28/2023 2/28/2023 Initial Diagnosis    Malignant neoplasm of ascending colon (HCC)      2/28/2023 -  Chemotherapy    OP COLORECTAL Capecitabine (Dose Selection Required) BID (8 cycles)           Review of Systems   Constitutional: Positive for fatigue.   Respiratory: Positive for chest tightness (Burning from mouth to chest ).    Neurological: Positive for headache.   All other systems reviewed and are negative.    Current Outpatient Medications on File Prior to Visit   Medication Sig Dispense Refill   • capecitabine (XELODA) 500 MG chemo tablet Take 4 tablets by mouth 2 (Two) Times a Day. Take 4 tab(s) by mouth in the AM and 4 tab(s) by mouth in the PM on days 1-14, then off for 7 days of a 21 day cycle. 112 tablet 7   • Diphenhydramine-Aluminum-Magnesium-Simethicone-Lidocaine-Nystatin Take 5 mL by mouth Every 4 (Four) Hours. 256 mL 3   • Diphenhydramine-Aluminum-Magnesium-Simethicone-Lidocaine-Nystatin Take 5 mL by mouth Every 4 (Four) Hours As Needed (mucositis or stomatitis). Swish and spit 5 ml every 4 hours as needed for mucositis or stomatitis 256 mL 2   • ondansetron (ZOFRAN) 8 MG tablet Take 1 tablet by mouth 3 (Three) Times a Day As Needed for Nausea or Vomiting. 30 tablet 5   • gabapentin (NEURONTIN) 100 MG capsule TAKE 1 CAPSULE BY MOUTH EVERY 8 HOURS FOR 14 DAYS     • methocarbamol (ROBAXIN) 500 MG tablet TAKE 2 TABLET BY MOUTH EVERY 8 HOURS X 14 DAYS AS NEEDED FOR MUSCLE SPASMS       No current facility-administered medications on file prior to visit.       No Known Allergies  Past Medical History:   Diagnosis Date   • AAA (abdominal aortic aneurysm) without rupture      Past Surgical History:   Procedure Laterality Date   • ABDOMINAL AORTIC ANEURYSM REPAIR  2019   • VASECTOMY  1994     Social History     Socioeconomic History   • Marital status:    Tobacco Use   • Smoking status: Former     Packs/day: 1.50     Years: 50.00     Pack years: 75.00     Types: Cigarettes     Quit date: 9/19/2019     Years since quitting: 3.4   • Smokeless tobacco: Never   Vaping Use   • Vaping Use:  Never used   Substance and Sexual Activity   • Alcohol use: Never     Family History   Problem Relation Age of Onset   • No Known Problems Other    • Diabetes Mother    • Cancer Father        Objective   Physical Exam    Well appearing patient in no acute distress on RA  Anicteric sclerae, no rash on exposed skin  Respirations non-labored  Awake, alert, and oriented x 4. Speech intact. No gross neurologic deficit  Appropriate mood and affect  No visible edema      Vitals:    03/17/23 0823   BP: 150/80   Pulse: 77   Resp: 18   Temp: 97.5 °F (36.4 °C)   TempSrc: Temporal   SpO2: 97%   Weight: 78 kg (171 lb 15.3 oz)   PainSc: 0-No pain               PHQ-9 Total Score:               Result Review :   The following data was reviewed by: Clarence Rose MD on 03/17/23:  Lab Results   Component Value Date    HGB 12.9 (L) 02/28/2023    HCT 40.3 02/28/2023    MCV 89.8 02/28/2023     02/28/2023    WBC 10.55 02/28/2023    NEUTROABS 6.39 02/28/2023    LYMPHSABS 2.48 02/28/2023    MONOSABS 1.02 (H) 02/28/2023    EOSABS 0.53 (H) 02/28/2023    BASOSABS 0.09 02/28/2023     Lab Results   Component Value Date    GLUCOSE 84 02/28/2023    BUN 18 02/28/2023    CREATININE 1.03 02/28/2023     02/28/2023    K 3.9 02/28/2023     02/28/2023    CO2 24.8 02/28/2023    CALCIUM 9.6 02/28/2023    PROTEINTOT 7.2 02/28/2023    ALBUMIN 4.5 02/28/2023    BILITOT 0.2 02/28/2023    ALKPHOS 79 02/28/2023    AST 15 02/28/2023    ALT 17 02/28/2023     No results found for: MG, PHOS, FREET4, TSH    Labs personally reviewed.      CEA level personally reviewed: 4.2    [facility] surgery outpatient documentation personally  reviewed.     No radiology results for the last 30 days.     Pathology   SYNOPTIC REPORT:   CASE SUMMARY: (COLON AND RECTUM: Resection)   Standard(s) : AJCC-UICC 8   Procedure:  Right hemicolectomy with abdominal wall resection, omentectomy   Macroscopic Evaluation of Mesorectum:   Not applicable    Tumor Site:  Cecum                     Mucinous adenocarcinoma   Histologic Type:     Histologic Type Comment:  Extensive signet ring cell features   Histologic Grade:  G3, poorly differentiated   Tumor Size:  Greatest dimension: 6.5 cm   Multiple Primary Sites:   Not applicable   Tumor Extent:  Tumor directly invades adjacent organ or structure: abdominal wall   Macroscopic Tumor Perforation:   Not identified   Lymphovascular Invasion:   Present: Large vessel (venous), extramural   Perineural Invasion:   Present   Treatment Effect:  No known presurgical therapy   Margin Status for Invasive Carcinoma:   All margins negative for invasive carcinoma      Distance from Invasive Carcinoma to Radial (Circumferential) Margin:    Not applicable   Margin Status for Non-Invasive Tumor:   All margins negative for high-grade dysplasia /   intramucosal carcinoma and low-grade dysplasia   Regional Lymph Node Status:   All regional lymph nodes negative for tumor     Number of Lymph Nodes Examined:  21   Tumor Deposits:  Not identified   Distant Site(s) Involved:   Not applicable   PATHOLOGIC STAGE CLASSIFICATION (pTNM, AJCC 8th Edition):    pT4b pN0   SPECIAL STUDIES:  Pending mismatch repair protein immunohistochemistry for addendum           Assessment and Plan    Diagnoses and all orders for this visit:    1. Stomatitis and mucositis (Primary)  -     Diphenhydramine-Aluminum-Magnesium-Simethicone-Lidocaine-Nystatin; Take 5 mL by mouth Every 4 (Four) Hours As Needed (mucositis and stomatitis). Swish and spit 5 ml every 4 hours as needed for mucositis or stomatitis  Dispense: 256 mL; Refill: 2    2. Malignant neoplasm of ascending colon (HCC)  -     CBC & Differential; Future  -     Comprehensive Metabolic Panel; Future    Stage II colon adenocarcinoma    Mr Pratt has high risk stage II colon adenocarcinoma s/p right hemicolectomy on 1/30/23 with pathology showing [id number] with LVI and PNI. 0/21 lymph nodes involved. G3,  poorly differentiated. Baseline CEA negative. Will obtain baseline CT imaging, which is ordered.  Discussed that because patient has high risk stage II colon cancer with the T4 stage as well LVI and PNI, I strongly recommend adjuvant chemotherapy per NCCN guidelines.  Because of his age, he would be recommended to have 6 months of combination of IV 5-FU or oral capecitabine with IV oxaliplatin.  Discussed that this gives him the best chance of cure. Patient is not interested in getting a port and is only interested in oral chemotherapy.  For this reason, oral capecitabine alone recommended.   Baseline ctDNA with [facility] completed and plan to continue to monitor this while on treatment.  We can consider stopping chemo after 3 months of treatment if the ctDNA remains negative.        C1D1 Capecitabine 3/11/23: 2000 mg AM and PM (initial dose of 1000 mg/m2 BID). Dose reduce to 1500 mg BID starting 3/17/23 due to intolerability. Will be off for 1 week starting 3/25 (14 days on/7 days off for 21 day cycle).  Discussed that dosing of chemotherapy will be based on his tolerability    PRN anti-emetics prescribed.    Gum sensitivity and throat burning  Magic mouthwash prescribed for as needed use.      This is an acute or chronic illness that poses a threat to life or bodily function. The above treatment plan involves a high risk of complications and/or mortality of patient management.        Patient Follow Up: 3/31/23  Patient was given instructions and counseling regarding his condition or for health maintenance advice. Please see specific information pulled into the AVS if appropriate.

## 2023-03-20 ENCOUNTER — SPECIALTY PHARMACY (OUTPATIENT)
Dept: PHARMACY | Facility: HOSPITAL | Age: 68
End: 2023-03-20
Payer: MEDICARE

## 2023-03-20 DIAGNOSIS — C18.2 MALIGNANT NEOPLASM OF ASCENDING COLON: ICD-10-CM

## 2023-03-20 RX ORDER — CAPECITABINE 500 MG/1
1500 TABLET, FILM COATED ORAL 2 TIMES DAILY
Qty: 36 TABLET | Refills: 0 | Status: SHIPPED | OUTPATIENT
Start: 2023-04-01

## 2023-03-20 RX ORDER — CAPECITABINE 500 MG/1
1500 TABLET, FILM COATED ORAL 2 TIMES DAILY
Qty: 84 TABLET | Refills: 11 | Status: SHIPPED | OUTPATIENT
Start: 2023-04-21

## 2023-03-20 NOTE — PROGRESS NOTES
Oral Chemotherapy - Double Check    Drug: capecitabine  Strength: 500mg tablet  Directions: Take 3 tablets PO BID on days 1-14 then off 7 days of a 21 day cycle  QTY: 84  RF:11    Released to pharmacy: [facility] [place]    Completed independent double check on medication order/RX.    3/20/2023  09:32 EDT

## 2023-03-20 NOTE — PROGRESS NOTES
"Re: Refills of Oral Specialty Medication - Xeloda (capecitabine)    • Drug-Drug Interactions: The current medication list was reviewed and there are no relevant drug-drug interactions.  • Medication Allergies: The patient has NKDA  • Review of Labs/Dose Adjustments: DOSE CHANGE - I reviewed the most recent note and labs. Due to toxicity (significant headache, gum sensitivity, and throat burning) the dose is being decreased. I sent refills as described below.  o Patient stopped his medication after a couple of days due side effects on 2000 mg twice daily dosing.  o Two separate prescriptions sent. One to complete C2 due to left over tablets from dose reduction. The second: a new updated prescription.    Drug: Xeloda (capecitabine)  Strength: 500 mg  Directions: Take 3 tablets by mouth twice a day ON days 1-14, OFF for 7 days of each 21 day cycle  Quantity: 36  Refills: 0    Drug: Xeloda (capecitabine)  Strength: 500 mg  Directions: Take 3 tablets by mouth twice a day ON days 1-14, OFF for 7 days of each 21 day cycle  Quantity: 84  Refills: 11    Both are \"dispense as brand\" due to backorder on generic.    Pharmacy prescription sent to: [facility]      Anisha West, PharmD, [facility]  Oncology Clinical Pharmacist  3/20/2023  09:08 EDT             "

## 2023-03-21 ENCOUNTER — PATIENT OUTREACH (OUTPATIENT)
Dept: ONCOLOGY | Facility: HOSPITAL | Age: 68
End: 2023-03-21
Payer: MEDICARE

## 2023-03-30 ENCOUNTER — HOSPITAL ENCOUNTER (OUTPATIENT)
Dept: CT IMAGING | Facility: HOSPITAL | Age: 68
Discharge: HOME | End: 2023-03-30
Admitting: INTERNAL MEDICINE
Payer: MEDICARE

## 2023-03-30 DIAGNOSIS — C18.9 MALIGNANT NEOPLASM OF COLON, UNSPECIFIED PART OF COLON: ICD-10-CM

## 2023-03-30 LAB
CREAT BLDA-MCNC: 1.3 MG/DL
EGFRCR SERPLBLD CKD-EPI 2021: 59.8 ML/MIN/1.73

## 2023-03-30 PROCEDURE — 82565 ASSAY OF CREATININE: CPT

## 2023-03-30 PROCEDURE — 25510000001 IOPAMIDOL PER 1 ML: Performed by: INTERNAL MEDICINE

## 2023-03-30 PROCEDURE — 74177 CT ABD & PELVIS W/CONTRAST: CPT

## 2023-03-30 PROCEDURE — 71260 CT THORAX DX C+: CPT

## 2023-03-30 RX ADMIN — IOPAMIDOL 96 ML: 755 INJECTION, SOLUTION INTRAVENOUS at 16:53

## 2023-03-31 ENCOUNTER — LAB (OUTPATIENT)
Dept: ONCOLOGY | Facility: HOSPITAL | Age: 68
End: 2023-03-31
Payer: MEDICARE

## 2023-03-31 ENCOUNTER — OFFICE VISIT (OUTPATIENT)
Dept: ONCOLOGY | Facility: HOSPITAL | Age: 68
End: 2023-03-31
Payer: MEDICARE

## 2023-03-31 VITALS
SYSTOLIC BLOOD PRESSURE: 159 MMHG | DIASTOLIC BLOOD PRESSURE: 80 MMHG | OXYGEN SATURATION: 96 % | WEIGHT: 171.74 LBS | TEMPERATURE: 97.7 F | RESPIRATION RATE: 18 BRPM | HEART RATE: 81 BPM | BODY MASS INDEX: 24.64 KG/M2

## 2023-03-31 DIAGNOSIS — C18.2 MALIGNANT NEOPLASM OF ASCENDING COLON: ICD-10-CM

## 2023-03-31 DIAGNOSIS — C18.9 MALIGNANT NEOPLASM OF COLON, UNSPECIFIED PART OF COLON: Primary | ICD-10-CM

## 2023-03-31 LAB
ALBUMIN SERPL-MCNC: 4.5 G/DL (ref 3.5–5.2)
ALBUMIN/GLOB SERPL: 2 G/DL
ALP SERPL-CCNC: 64 U/L (ref 39–117)
ALT SERPL W P-5'-P-CCNC: 13 U/L (ref 1–41)
ANION GAP SERPL CALCULATED.3IONS-SCNC: 11.4 MMOL/L (ref 5–15)
AST SERPL-CCNC: 15 U/L (ref 1–40)
BASOPHILS # BLD AUTO: 0.07 10*3/MM3 (ref 0–0.2)
BASOPHILS NFR BLD AUTO: 0.9 % (ref 0–1.5)
BILIRUB SERPL-MCNC: 0.3 MG/DL (ref 0–1.2)
BUN SERPL-MCNC: 18 MG/DL (ref 8–23)
BUN/CREAT SERPL: 13.2 (ref 7–25)
CALCIUM SPEC-SCNC: 9.3 MG/DL (ref 8.6–10.5)
CHLORIDE SERPL-SCNC: 105 MMOL/L (ref 98–107)
CO2 SERPL-SCNC: 24.6 MMOL/L (ref 22–29)
CREAT SERPL-MCNC: 1.36 MG/DL (ref 0.76–1.27)
DEPRECATED RDW RBC AUTO: 56.1 FL (ref 37–54)
EGFRCR SERPLBLD CKD-EPI 2021: 56.7 ML/MIN/1.73
EOSINOPHIL # BLD AUTO: 0.28 10*3/MM3 (ref 0–0.4)
EOSINOPHIL NFR BLD AUTO: 3.6 % (ref 0.3–6.2)
ERYTHROCYTE [DISTWIDTH] IN BLOOD BY AUTOMATED COUNT: 18.3 % (ref 12.3–15.4)
GLOBULIN UR ELPH-MCNC: 2.2 GM/DL
GLUCOSE SERPL-MCNC: 106 MG/DL (ref 65–99)
HCT VFR BLD AUTO: 42.6 % (ref 37.5–51)
HGB BLD-MCNC: 14.3 G/DL (ref 13–17.7)
IMM GRANULOCYTES # BLD AUTO: 0.02 10*3/MM3 (ref 0–0.05)
IMM GRANULOCYTES NFR BLD AUTO: 0.3 % (ref 0–0.5)
LYMPHOCYTES # BLD AUTO: 2.55 10*3/MM3 (ref 0.7–3.1)
LYMPHOCYTES NFR BLD AUTO: 32.7 % (ref 19.6–45.3)
MCH RBC QN AUTO: 30.8 PG (ref 26.6–33)
MCHC RBC AUTO-ENTMCNC: 33.6 G/DL (ref 31.5–35.7)
MCV RBC AUTO: 91.6 FL (ref 79–97)
MONOCYTES # BLD AUTO: 0.82 10*3/MM3 (ref 0.1–0.9)
MONOCYTES NFR BLD AUTO: 10.5 % (ref 5–12)
NEUTROPHILS NFR BLD AUTO: 4.06 10*3/MM3 (ref 1.7–7)
NEUTROPHILS NFR BLD AUTO: 52 % (ref 42.7–76)
PLATELET # BLD AUTO: 203 10*3/MM3 (ref 140–450)
PMV BLD AUTO: 9.3 FL (ref 6–12)
POTASSIUM SERPL-SCNC: 4.2 MMOL/L (ref 3.5–5.2)
PROT SERPL-MCNC: 6.7 G/DL (ref 6–8.5)
RBC # BLD AUTO: 4.65 10*6/MM3 (ref 4.14–5.8)
SODIUM SERPL-SCNC: 141 MMOL/L (ref 136–145)
WBC NRBC COR # BLD: 7.8 10*3/MM3 (ref 3.4–10.8)

## 2023-03-31 PROCEDURE — 85025 COMPLETE CBC W/AUTO DIFF WBC: CPT

## 2023-03-31 PROCEDURE — 36415 COLL VENOUS BLD VENIPUNCTURE: CPT

## 2023-03-31 PROCEDURE — 1126F AMNT PAIN NOTED NONE PRSNT: CPT | Performed by: INTERNAL MEDICINE

## 2023-03-31 PROCEDURE — 80053 COMPREHEN METABOLIC PANEL: CPT

## 2023-03-31 PROCEDURE — 99214 OFFICE O/P EST MOD 30 MIN: CPT | Performed by: INTERNAL MEDICINE

## 2023-03-31 PROCEDURE — 1159F MED LIST DOCD IN RCRD: CPT | Performed by: INTERNAL MEDICINE

## 2023-03-31 NOTE — PROGRESS NOTES
Chief Complaint  Malignant neoplasm of ascending colon (HCC)    Umer Antonio MD Preston, Steven H, MD    Subjective          Demetri Pratt presents to [facility] HEMATOLOGY & ONCOLOGY for right sided colon cancer    Mr. Pratt is a pleasant 67-year-old presenting with his wife for new oncology evaluation diagnosis of right-sided colon cancer.  He has a past medical history of AAA status postrepair.  He started reduced dose 1500 mg BID capecitabine due to tolerability issues with the 2000 mg BID dose. He reports it was better but he still had gum burning/ache with associated headache and upper esophageal pain. He reports this occurred with exertion and would go away after about 10 to 15 minutes of rest. He denies any nausea, vomiting, diarrhea, abdominal pain, rash, fevers, chills. He is due to start off week now.     Oncology/Hematology History Overview Note   12/29/22: CTA of abdomen (prior history of AAA) demonstrated masslike circumferential wall thickening of the cecum and proximal ascending colon. CEA 4.2 (wnl)    1/30/23: Lap converted to open Right hemicolectomy with abdominal wall resection, omentectomy at the [facility]. 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  Tumor directly invades adjacent organ or structure: abdominal wall, no perforation, LVI and PNI both present.  All margins negative for invasive carcinoma. All 21 regional lymph nodes negative for tumor: Staged at pT4b pN0.     3/2/23: [facility] (1st ctDNA test): negative    3/11/23: C1D1 Capecitabine dosed at 2000 mg in AM and 2000 mg in PM. Complicated by headache and gum and throat pain. Decrease to 1500 mg AM and 1500 mg PM on 3/17/23.     3/30/23: CT CAP with contrast: negative for disease/mets    4/7/23: Planned C2 1500 mg BID capecitabine start       Malignant neoplasm of ascending colon (HCC)    1/30/2023 Cancer Staged    Staging form: Colon And Rectum, AJCC 8th Edition  - Pathologic stage from 1/30/2023: Stage IIC (pT4b, pN0, cM0) - Signed by Clarence Rose MD on 2/28/2023 2/28/2023 Initial Diagnosis    Malignant neoplasm of ascending colon (HCC)     2/28/2023 -  Chemotherapy    OP COLORECTAL Capecitabine (Dose Selection Required) BID (8 cycles)           Review of Systems   Constitutional: Positive for fatigue.   Respiratory: Positive for chest tightness (Burning from mouth to chest ).    Neurological: Positive for headache.   All other systems reviewed and are negative.    Current Outpatient Medications on File Prior to Visit   Medication Sig Dispense Refill   • [START ON 4/1/2023] capecitabine (XELODA) 500 MG chemo tablet Take 3 tablets by mouth 2 (Two) Times a Day. Take 3 tab(s) by mouth in the AM and 3 tab(s) by mouth in the PM on days 1-14, then off for 7 days of a 21 day cycle. 36 tablet 0   • [START ON 4/21/2023] capecitabine (XELODA) 500 MG chemo tablet Take 3 tablets by mouth 2 (Two) Times a Day. Take 3 tabs by mouth in the AM and 3 tabs by mouth in the PM on days 1-14, then off 7 days of a 21 day cycle 84 tablet 11   • Diphenhydramine-Aluminum-Magnesium-Simethicone-Lidocaine-Nystatin Take 5 mL by mouth Every 4 (Four) Hours As Needed (mucositis and stomatitis). Swish and spit 5 ml every 4 hours as needed for mucositis or stomatitis 256 mL 2   • ondansetron (ZOFRAN) 8 MG tablet Take 1 tablet by mouth 3 (Three) Times a Day As Needed for Nausea or Vomiting. 30 tablet 5     Current Facility-Administered Medications on File Prior to Visit   Medication Dose Route Frequency Provider Last Rate Last Admin   • [COMPLETED] iopamidol (ISOVUE-370) 76 % injection 100 mL  100 mL Intravenous Once in imaging Clarence Rose MD   96 mL at 03/30/23 3131       No Known Allergies  Past Medical History:   Diagnosis Date   • AAA (abdominal aortic aneurysm) without rupture      Past Surgical  History:   Procedure Laterality Date   • ABDOMINAL AORTIC ANEURYSM REPAIR  2019   • VASECTOMY  1994     Social History     Socioeconomic History   • Marital status:    Tobacco Use   • Smoking status: Former     Packs/day: 1.50     Years: 50.00     Pack years: 75.00     Types: Cigarettes     Quit date: 9/19/2019     Years since quitting: 3.5   • Smokeless tobacco: Never   Vaping Use   • Vaping Use: Never used   Substance and Sexual Activity   • Alcohol use: Never     Family History   Problem Relation Age of Onset   • No Known Problems Other    • Diabetes Mother    • Cancer Father        Objective   Physical Exam    Well appearing patient in no acute distress on RA  Anicteric sclerae, no rash on exposed skin  Respirations non-labored  Awake, alert, and oriented x 4. Speech intact.   Appropriate mood and affect  No visible edema      Vitals:    03/31/23 0935   BP: 159/80   Pulse: 81   Resp: 18   Temp: 97.7 °F (36.5 °C)   TempSrc: Temporal   SpO2: 96%   Weight: 77.9 kg (171 lb 11.8 oz)   PainSc: 0-No pain               PHQ-9 Total Score:             Result Review :   The following data was reviewed by: Clarence Rose MD on 03/31/23:  Lab Results   Component Value Date    HGB 14.3 03/31/2023    HCT 42.6 03/31/2023    MCV 91.6 03/31/2023     03/31/2023    WBC 7.80 03/31/2023    NEUTROABS 4.06 03/31/2023    LYMPHSABS 2.55 03/31/2023    MONOSABS 0.82 03/31/2023    EOSABS 0.28 03/31/2023    BASOSABS 0.07 03/31/2023     Lab Results   Component Value Date    GLUCOSE 106 (H) 03/31/2023    BUN 18 03/31/2023    CREATININE 1.36 (H) 03/31/2023     03/31/2023    K 4.2 03/31/2023     03/31/2023    CO2 24.6 03/31/2023    CALCIUM 9.3 03/31/2023    PROTEINTOT 6.7 03/31/2023    ALBUMIN 4.5 03/31/2023    BILITOT 0.3 03/31/2023    ALKPHOS 64 03/31/2023    AST 15 03/31/2023    ALT 13 03/31/2023     No results found for: MG, PHOS, FREET4, TSH    Labs personally reviewed. CBC normal. GFR 56.7, no dose  adjustment needed    CEA level personally reviewed: 4.2      3/31/23 CT CAP personally reviewed and negative for mets.     CT Chest With Contrast Diagnostic    Result Date: 3/31/2023    1. No evidence of metastasis within chest. 2. No acute cardiopulmonary process.     LOLIS MCLAUGHLIN MD       Electronically Signed and Approved By: LOLIS MCLAUGHLIN MD on 3/31/2023 at 2:11             CT Abdomen Pelvis With Contrast    Result Date: 3/31/2023    1. No evidence of metastasis within abdomen/pelvis. 2. Changes from right hemicolectomy. 3. Moderate left renal atrophy. 4. 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  The excluded aneurysm sac has decreased in caliber from prior CT.     LOLIS MCLAUGHLIN MD       Electronically Signed and Approved By: LOLIS MCLAUGHLIN MD on 3/31/2023 at 2:17                Pathology   SYNOPTIC REPORT:   CASE SUMMARY: (COLON AND RECTUM: Resection)   Standard(s) : AJCC-UICC 8   Procedure:  Right hemicolectomy with abdominal wall resection, omentectomy   Macroscopic Evaluation of Mesorectum:   Not applicable   Tumor Site:  Cecum                     Mucinous adenocarcinoma   Histologic Type:     Histologic Type Comment:  Extensive signet ring cell features   Histologic Grade:  G3, poorly differentiated   Tumor Size:  Greatest dimension: 6.5 cm   Multiple Primary Sites:   Not applicable   Tumor Extent:  Tumor directly invades adjacent organ or structure: abdominal wall   Macroscopic Tumor Perforation:   Not identified   Lymphovascular Invasion:   Present: Large vessel (venous), extramural   Perineural Invasion:   Present   Treatment Effect:  No known presurgical therapy   Margin Status for Invasive Carcinoma:   All margins negative for invasive carcinoma      Distance from Invasive Carcinoma to Radial (Circumferential) Margin:    Not applicable   Margin Status for Non-Invasive Tumor:   All margins negative for  high-grade dysplasia /   intramucosal carcinoma and low-grade dysplasia   Regional Lymph Node Status:   All regional lymph nodes negative for tumor     Number of Lymph Nodes Examined:  21   Tumor Deposits:  Not identified   Distant Site(s) Involved:   Not applicable   PATHOLOGIC STAGE CLASSIFICATION (pTNM, AJCC 8th Edition):    pT4b pN0   SPECIAL STUDIES:  Pending mismatch repair protein immunohistochemistry for addendum           Assessment and Plan    Diagnoses and all orders for this visit:    1. Malignant neoplasm of colon, unspecified part of colon (HCC) (Primary)  -     CBC & Differential; Future  -     Comprehensive Metabolic Panel; Future    Stage II colon adenocarcinoma    Mr Pratt has high risk stage II colon adenocarcinoma s/p right hemicolectomy on 1/30/23 with pathology showing [id number] with LVI and PNI. 0/21 lymph nodes involved. G3, poorly differentiated. Baseline CEA negative. Will obtain baseline CT imaging, which is ordered.  Discussed that because patient has high risk stage II colon cancer with the T4 stage as well LVI and PNI, I strongly recommend adjuvant chemotherapy per NCCN guidelines.  Because of his age, he would be recommended to have 6 months of combination of IV 5-FU or oral capecitabine with IV oxaliplatin.  Discussed that this gives him the best chance of cure. Patient is not interested in getting a port and is only interested in oral chemotherapy.  For this reason, oral capecitabine alone recommended.   Baseline ctDNA with [facility] completed 3/2/23 and negative. Will repeat at time of C3 and plan to continue to monitor this while on treatment.  We can consider stopping chemo after 3 months of treatment if the ctDNA remains negative.      C1D1 Capecitabine 3/11/23: 2000 mg AM and PM (initial dose of 1000 mg/m2 BID). Dose reduce to 1500 mg BID starting 3/17/23 due to intolerability. Cycle is 14 days on/7 days off for 21 day cycle. Plan to start C2 4/7/23.  Discussed that dosing of  chemotherapy will be based on his tolerability    3/31/23 GFR 57, no dose adjustment needed. Labs appropriate to proceed.     PRN anti-emetics prescribed.    Gum sensitivity and throat burning  Magic mouthwash prescribed for as needed use.      This is an acute or chronic illness that poses a threat to life or bodily function. The above treatment plan involves a high risk of complications and/or mortality of patient management. Will continue with labs to monitor for toxicities.       Patient Follow Up: 4/28/23 with labs   Patient was given instructions and counseling regarding his condition or for health maintenance advice. Please see specific information pulled into the AVS if appropriate.

## 2023-04-03 ENCOUNTER — SPECIALTY PHARMACY (OUTPATIENT)
Dept: PHARMACY | Facility: HOSPITAL | Age: 68
End: 2023-04-03
Payer: MEDICARE

## 2023-04-28 ENCOUNTER — OFFICE VISIT (OUTPATIENT)
Dept: ONCOLOGY | Facility: HOSPITAL | Age: 68
End: 2023-04-28
Payer: MEDICARE

## 2023-04-28 ENCOUNTER — LAB (OUTPATIENT)
Dept: ONCOLOGY | Facility: HOSPITAL | Age: 68
End: 2023-04-28
Payer: MEDICARE

## 2023-04-28 VITALS
DIASTOLIC BLOOD PRESSURE: 77 MMHG | BODY MASS INDEX: 25.12 KG/M2 | TEMPERATURE: 98 F | RESPIRATION RATE: 16 BRPM | HEART RATE: 75 BPM | OXYGEN SATURATION: 96 % | SYSTOLIC BLOOD PRESSURE: 143 MMHG | WEIGHT: 175.04 LBS

## 2023-04-28 DIAGNOSIS — C18.2 MALIGNANT NEOPLASM OF ASCENDING COLON: Primary | ICD-10-CM

## 2023-04-28 DIAGNOSIS — C18.9 MALIGNANT NEOPLASM OF COLON, UNSPECIFIED PART OF COLON: ICD-10-CM

## 2023-04-28 LAB
ALBUMIN SERPL-MCNC: 4.2 G/DL (ref 3.5–5.2)
ALBUMIN/GLOB SERPL: 1.9 G/DL
ALP SERPL-CCNC: 57 U/L (ref 39–117)
ALT SERPL W P-5'-P-CCNC: 11 U/L (ref 1–41)
ANION GAP SERPL CALCULATED.3IONS-SCNC: 10 MMOL/L (ref 5–15)
AST SERPL-CCNC: 14 U/L (ref 1–40)
BASOPHILS # BLD AUTO: 0.06 10*3/MM3 (ref 0–0.2)
BASOPHILS NFR BLD AUTO: 0.9 % (ref 0–1.5)
BILIRUB SERPL-MCNC: 0.3 MG/DL (ref 0–1.2)
BUN SERPL-MCNC: 19 MG/DL (ref 8–23)
BUN/CREAT SERPL: 16.7 (ref 7–25)
CALCIUM SPEC-SCNC: 9 MG/DL (ref 8.6–10.5)
CHLORIDE SERPL-SCNC: 110 MMOL/L (ref 98–107)
CO2 SERPL-SCNC: 22 MMOL/L (ref 22–29)
CREAT SERPL-MCNC: 1.14 MG/DL (ref 0.76–1.27)
DEPRECATED RDW RBC AUTO: 68 FL (ref 37–54)
EGFRCR SERPLBLD CKD-EPI 2021: 70.1 ML/MIN/1.73
EOSINOPHIL # BLD AUTO: 0.3 10*3/MM3 (ref 0–0.4)
EOSINOPHIL NFR BLD AUTO: 4.3 % (ref 0.3–6.2)
ERYTHROCYTE [DISTWIDTH] IN BLOOD BY AUTOMATED COUNT: 19.4 % (ref 12.3–15.4)
GLOBULIN UR ELPH-MCNC: 2.2 GM/DL
GLUCOSE SERPL-MCNC: 105 MG/DL (ref 65–99)
HCT VFR BLD AUTO: 42 % (ref 37.5–51)
HGB BLD-MCNC: 14.6 G/DL (ref 13–17.7)
IMM GRANULOCYTES # BLD AUTO: 0.01 10*3/MM3 (ref 0–0.05)
IMM GRANULOCYTES NFR BLD AUTO: 0.1 % (ref 0–0.5)
LYMPHOCYTES # BLD AUTO: 2.41 10*3/MM3 (ref 0.7–3.1)
LYMPHOCYTES NFR BLD AUTO: 34.3 % (ref 19.6–45.3)
MCH RBC QN AUTO: 33 PG (ref 26.6–33)
MCHC RBC AUTO-ENTMCNC: 34.8 G/DL (ref 31.5–35.7)
MCV RBC AUTO: 95 FL (ref 79–97)
MONOCYTES # BLD AUTO: 0.84 10*3/MM3 (ref 0.1–0.9)
MONOCYTES NFR BLD AUTO: 11.9 % (ref 5–12)
NEUTROPHILS NFR BLD AUTO: 3.41 10*3/MM3 (ref 1.7–7)
NEUTROPHILS NFR BLD AUTO: 48.5 % (ref 42.7–76)
PLATELET # BLD AUTO: 223 10*3/MM3 (ref 140–450)
PMV BLD AUTO: 9.8 FL (ref 6–12)
POTASSIUM SERPL-SCNC: 4.3 MMOL/L (ref 3.5–5.2)
PROT SERPL-MCNC: 6.4 G/DL (ref 6–8.5)
RBC # BLD AUTO: 4.42 10*6/MM3 (ref 4.14–5.8)
SODIUM SERPL-SCNC: 142 MMOL/L (ref 136–145)
WBC NRBC COR # BLD: 7.03 10*3/MM3 (ref 3.4–10.8)

## 2023-04-28 PROCEDURE — 85025 COMPLETE CBC W/AUTO DIFF WBC: CPT

## 2023-04-28 PROCEDURE — 36415 COLL VENOUS BLD VENIPUNCTURE: CPT

## 2023-04-28 PROCEDURE — G0463 HOSPITAL OUTPT CLINIC VISIT: HCPCS | Performed by: INTERNAL MEDICINE

## 2023-04-28 PROCEDURE — 80053 COMPREHEN METABOLIC PANEL: CPT

## 2023-04-28 NOTE — PROGRESS NOTES
Chief Complaint  MALIGNANT NEOPLASM OF CECUM     Umer Antonio MD Preston, Steven H, MD    Subjective          Demetri Pratt presents to [facility] for right sided colon cancer    Mr. Pratt is a pleasant 67-year-old presenting with his wife for new oncology evaluation diagnosis of right-sided colon cancer.  He has a past medical history of AAA status postrepair.  He reported side effects with dose reduced capecitabine. He reports he stopped it about a week and a half ago and feels much better. His fatigue is better. He is eating more and gaining weight. He is not interested in going back on the capecitabine at this time. No fevers, chills, infections.     Oncology/Hematology History Overview Note   12/29/22: CTA of abdomen (prior history of AAA) demonstrated masslike circumferential wall thickening of the cecum and proximal ascending colon. CEA 4.2 (wnl)    1/30/23: Lap converted to open Right hemicolectomy with abdominal wall resection, omentectomy at the [facility]. 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  Tumor directly invades adjacent organ or structure: abdominal wall, no perforation, LVI and PNI both present.  All margins negative for invasive carcinoma. All 21 regional lymph nodes negative for tumor: Staged at pT4b pN0.     3/2/23: [facility] (1st ctDNA test): negative    3/11/23: C1D1 Capecitabine dosed at 2000 mg in AM and 2000 mg in PM. Complicated by headache and gum and throat pain. Decrease to 1500 mg AM and 1500 mg PM on 3/17/23.     3/30/23: CT CAP with contrast: negative for disease/mets    4/7/23: Planned C2 1500 mg BID capecitabine start. Discontinued after 1.5 weeks due to side effects. Will not plan any further dose reduction.        Malignant neoplasm of ascending colon   1/30/2023 Cancer Staged    Staging form: Colon And Rectum,  AJCC 8th Edition  - Pathologic stage from 1/30/2023: Stage IIC (pT4b, pN0, cM0) - Signed by Clarence Rose MD on 2/28/2023 2/28/2023 Initial Diagnosis    Malignant neoplasm of ascending colon (HCC)     2/28/2023 -  Chemotherapy    OP COLORECTAL Capecitabine (Dose Selection Required) BID (8 cycles)           Review of Systems   Constitutional: Negative for fatigue.   Respiratory: Negative for chest tightness.    Neurological: Negative for headache.   All other systems reviewed and are negative.    Current Outpatient Medications on File Prior to Visit   Medication Sig Dispense Refill   • capecitabine (XELODA) 500 MG chemo tablet Take 3 tablets by mouth 2 (Two) Times a Day. Take 3 tab(s) by mouth in the AM and 3 tab(s) by mouth in the PM on days 1-14, then off for 7 days of a 21 day cycle. 36 tablet 0   • capecitabine (XELODA) 500 MG chemo tablet Take 3 tablets by mouth 2 (Two) Times a Day. Take 3 tabs by mouth in the AM and 3 tabs by mouth in the PM on days 1-14, then off 7 days of a 21 day cycle 84 tablet 11   • Diphenhydramine-Aluminum-Magnesium-Simethicone-Lidocaine-Nystatin Take 5 mL by mouth Every 4 (Four) Hours As Needed (mucositis and stomatitis). Swish and spit 5 ml every 4 hours as needed for mucositis or stomatitis 256 mL 2   • ondansetron (ZOFRAN) 8 MG tablet Take 1 tablet by mouth 3 (Three) Times a Day As Needed for Nausea or Vomiting. 30 tablet 5     No current facility-administered medications on file prior to visit.       No Known Allergies  Past Medical History:   Diagnosis Date   • AAA (abdominal aortic aneurysm) without rupture      Past Surgical History:   Procedure Laterality Date   • ABDOMINAL AORTIC ANEURYSM REPAIR  2019   • VASECTOMY  1994     Social History     Socioeconomic History   • Marital status:    Tobacco Use   • Smoking status: Former     Packs/day: 1.50     Years: 50.00     Pack years: 75.00     Types: Cigarettes     Quit date: 9/19/2019     Years since quitting:  3.6   • Smokeless tobacco: Never   Vaping Use   • Vaping Use: Never used   Substance and Sexual Activity   • Alcohol use: Never     Family History   Problem Relation Age of Onset   • No Known Problems Other    • Diabetes Mother    • Cancer Father        Objective   Physical Exam    Well appearing patient in no acute distress on RA  Anicteric sclerae, no rash on exposed skin  Respirations non-labored  Awake, alert, and oriented x 4. Speech intact. No neurologic deficit  Appropriate mood and affect  No visible edema      Vitals:    04/28/23 0928   BP: 143/77   Pulse: 75   Resp: 16   Temp: 98 °F (36.7 °C)   TempSrc: Temporal   SpO2: 96%   Weight: 79.4 kg (175 lb 0.7 oz)   PainSc: 0-No pain               PHQ-9 Total Score:             Result Review :   The following data was reviewed by: Clarence Rose MD on 04/28/23:  Lab Results   Component Value Date    HGB 14.6 04/28/2023    HCT 42.0 04/28/2023    MCV 95.0 04/28/2023     04/28/2023    WBC 7.03 04/28/2023    NEUTROABS 3.41 04/28/2023    LYMPHSABS 2.41 04/28/2023    MONOSABS 0.84 04/28/2023    EOSABS 0.30 04/28/2023    BASOSABS 0.06 04/28/2023     Lab Results   Component Value Date    GLUCOSE 105 (H) 04/28/2023    BUN 19 04/28/2023    CREATININE 1.14 04/28/2023     04/28/2023    K 4.3 04/28/2023     (H) 04/28/2023    CO2 22.0 04/28/2023    CALCIUM 9.0 04/28/2023    PROTEINTOT 6.4 04/28/2023    ALBUMIN 4.2 04/28/2023    BILITOT 0.3 04/28/2023    ALKPHOS 57 04/28/2023    AST 14 04/28/2023    ALT 11 04/28/2023     No results found for: MG, PHOS, FREET4, TSH    Labs personally reviewed. CBC normal.     CEA level personally reviewed: 4.2    3/31/23 CT CAP personally reviewed and negative for mets.     CT Chest With Contrast Diagnostic    Result Date: 3/31/2023    1. No evidence of metastasis within chest. 2. No acute cardiopulmonary process.     LOLIS MCLAUGHLIN MD       Electronically Signed and Approved By: LOLIS MCLAUGHLIN MD on 3/31/2023 at  2:11             CT Abdomen Pelvis With Contrast    Result Date: 3/31/2023    1. No evidence of metastasis within abdomen/pelvis. 2. Changes from right hemicolectomy. 3. Moderate left renal atrophy. 4. 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  The excluded aneurysm sac has decreased in caliber from prior CT.     LOLIS MCLAUGHLIN MD       Electronically Signed and Approved By: LOLIS MCLAUGHLIN MD on 3/31/2023 at 2:17                Pathology   SYNOPTIC REPORT:   CASE SUMMARY: (COLON AND RECTUM: Resection)   Standard(s) : AJCC-UICC 8   Procedure:  Right hemicolectomy with abdominal wall resection, omentectomy   Macroscopic Evaluation of Mesorectum:   Not applicable   Tumor Site:  Cecum                     Mucinous adenocarcinoma   Histologic Type:     Histologic Type Comment:  Extensive signet ring cell features   Histologic Grade:  G3, poorly differentiated   Tumor Size:  Greatest dimension: 6.5 cm   Multiple Primary Sites:   Not applicable   Tumor Extent:  Tumor directly invades adjacent organ or structure: abdominal wall   Macroscopic Tumor Perforation:   Not identified   Lymphovascular Invasion:   Present: Large vessel (venous), extramural   Perineural Invasion:   Present   Treatment Effect:  No known presurgical therapy   Margin Status for Invasive Carcinoma:   All margins negative for invasive carcinoma      Distance from Invasive Carcinoma to Radial (Circumferential) Margin:    Not applicable   Margin Status for Non-Invasive Tumor:   All margins negative for high-grade dysplasia /   intramucosal carcinoma and low-grade dysplasia   Regional Lymph Node Status:   All regional lymph nodes negative for tumor     Number of Lymph Nodes Examined:  21   Tumor Deposits:  Not identified   Distant Site(s) Involved:   Not applicable   PATHOLOGIC STAGE CLASSIFICATION (pTNM, AJCC 8th Edition):    pT4b pN0   SPECIAL STUDIES:  Pending mismatch  repair protein immunohistochemistry for addendum         Assessment and Plan    Diagnoses and all orders for this visit:    1. Malignant neoplasm of ascending colon (Primary)  -     CT chest w contrast; Future  -     CT abdomen pelvis w contrast; Future    Stage II colon adenocarcinoma    Mr Pratt has high risk stage II colon adenocarcinoma s/p right hemicolectomy on 1/30/23 with pathology showing [id number] with LVI and PNI. 0/21 lymph nodes involved. G3, poorly differentiated. Baseline CEA negative. Will obtain baseline CT imaging, which is ordered.  Due to high risk stage II colon cancer with the T4 stage as well LVI and PNI, adjuvant chemotherapy was recommended. Patient not interested in IV chemo or getting a port, so he was started on single agent oral capecitabine. Patient was unable to tolerate even after dose reduction with Cycle 2. He is not interested in resuming. Will therefore hold further chemotherapy and plan for surveillance.  Baseline ctDNA with [facility] completed 3/2/23 and negative. Repeat due today. This will provide support that cancer is in remission. Repeat [facility] in 6 months with repeat CT imaging. Patient will need repeat colonoscopy 1 year out from surgery.       Patient Follow Up: 6 months with scans  Patient was given instructions and counseling regarding his condition or for health maintenance advice. Please see specific information pulled into the AVS if appropriate.

## 2023-05-04 ENCOUNTER — OFFICE VISIT (OUTPATIENT)
Dept: ORTHOPEDIC SURGERY | Facility: CLINIC | Age: 68
End: 2023-05-04
Payer: MEDICARE

## 2023-05-04 VITALS — HEIGHT: 70 IN | HEART RATE: 85 BPM | OXYGEN SATURATION: 95 % | BODY MASS INDEX: 25.05 KG/M2 | WEIGHT: 175 LBS

## 2023-05-04 DIAGNOSIS — M70.72 BURSITIS OF LEFT HIP, UNSPECIFIED BURSA: Primary | ICD-10-CM

## 2023-05-04 RX ADMIN — TRIAMCINOLONE ACETONIDE 40 MG: 40 INJECTION, SUSPENSION INTRA-ARTICULAR; INTRAMUSCULAR at 14:38

## 2023-05-04 RX ADMIN — LIDOCAINE HYDROCHLORIDE 5 ML: 10 INJECTION, SOLUTION EPIDURAL; INFILTRATION; INTRACAUDAL; PERINEURAL at 14:38

## 2023-05-04 NOTE — PROGRESS NOTES
"Chief Complaint  Follow-up and Pain of the Left Hip     Subjective      Demetri Pratt presents to [facility] for follow-up of the left hip. Patient last seen us in November with a Voltaren prescription. Patient reports he had colon cancer surgery so he has not been back here due to that. He reports having pain over the lateral hip radiating to the knee and also some pain to the buttock. He reports symptoms with prolonged walking and stiffness with sitting. He reports the most pain over the lateral hip.     No Known Allergies     Social History     Socioeconomic History   • Marital status:    Tobacco Use   • Smoking status: Every Day     Packs/day: 1.50     Years: 50.00     Pack years: 75.00     Types: Cigarettes     Start date: 1/1/1968     Last attempt to quit: 9/19/2019     Years since quitting: 3.6   • Smokeless tobacco: Never   Vaping Use   • Vaping Use: Never used   Substance and Sexual Activity   • Alcohol use: Never   • Drug use: Never   • Sexual activity: Yes     Partners: Female     Birth control/protection: None, Vasectomy        Review of Systems     Objective   Vital Signs:   Pulse 85   Ht 177.8 cm (70\")   Wt 79.4 kg (175 lb)   SpO2 95%   BMI 25.11 kg/m²       Physical Exam  Constitutional:       Appearance: Normal appearance. Patient is well-developed and normal weight.   HENT:      Head: Normocephalic.      Right Ear: Hearing and external ear normal.      Left Ear: Hearing and external ear normal.      Nose: Nose normal.   Eyes:      Conjunctiva/sclera: Conjunctivae normal.   Cardiovascular:      Rate and Rhythm: Normal rate.   Pulmonary:      Effort: Pulmonary effort is normal.      Breath sounds: No wheezing or rales.   Abdominal:      Palpations: Abdomen is soft.      Tenderness: There is no abdominal tenderness.   Musculoskeletal:      Cervical back: Normal range of motion.   Skin:     Findings: No rash.   Neurological:      Mental Status: Patient " is alert and oriented to person, place, and time.   Psychiatric:         Mood and Affect: Mood and affect normal.         Judgment: Judgment normal.       Ortho Exam      No swelling. No skin discoloration or muscle atrophy. Dorsal pedal pulse 2+. Posterior tibialis pulse is 2+. Good strength to hamstrings, quadriceps, dorsiflexors, and plantar flexors. Calf supple. Sensation intact. Neurovascular Intact. Full ROM.      Large Joint LEFT HIP  Date/Time: 5/4/2023 2:38 PM  Consent given by: patient  Site marked: site marked  Timeout: Immediately prior to procedure a time out was called to verify the correct patient, procedure, equipment, support staff and site/side marked as required   Supporting Documentation  Indications: pain   Procedure Details  Location: hip -   Preparation: Patient was prepped and draped in the usual sterile fashion  Needle gauge: 21G.  Medications administered: 5 mL lidocaine PF 1% 1 %; 40 mg triamcinolone acetonide 40 MG/ML  Patient tolerance: patient tolerated the procedure well with no immediate complications            Imaging Results (Most Recent)     None           Result Review :         No results found.           Assessment and Plan     Diagnoses and all orders for this visit:    1. Bursitis of left hip, unspecified bursa (Primary)        Patient wished to proceed with a hip bursal injection today    Educated on risk of smoking. Discussed options for smoking cessation.    Follow Up     4 to 6 weeks      Patient was given instructions and counseling regarding his condition or for health maintenance advice. Please see specific information pulled into the AVS if appropriate.     Scribed for Lex Arnold MD by Shira Knight.  05/04/23   14:20 EDT    I have personally performed the services described in this document as scribed by the above individual and it is both accurate and complete. Lex Arnold MD 05/05/23

## 2023-05-05 RX ORDER — LIDOCAINE HYDROCHLORIDE 10 MG/ML
5 INJECTION, SOLUTION EPIDURAL; INFILTRATION; INTRACAUDAL; PERINEURAL
Status: COMPLETED | OUTPATIENT
Start: 2023-05-04 | End: 2023-05-04

## 2023-05-05 RX ORDER — TRIAMCINOLONE ACETONIDE 40 MG/ML
40 INJECTION, SUSPENSION INTRA-ARTICULAR; INTRAMUSCULAR
Status: COMPLETED | OUTPATIENT
Start: 2023-05-04 | End: 2023-05-04

## 2023-10-23 ENCOUNTER — TRANSCRIBE ORDERS (OUTPATIENT)
Dept: ONCOLOGY | Facility: HOSPITAL | Age: 68
End: 2023-10-23
Payer: MEDICARE

## 2023-10-23 ENCOUNTER — TELEPHONE (OUTPATIENT)
Dept: ONCOLOGY | Facility: HOSPITAL | Age: 68
End: 2023-10-23
Payer: MEDICARE

## 2023-10-23 NOTE — TELEPHONE ENCOUNTER
"    Caller: Demetri Pratt \"Naeem\"    Relationship to patient: Self    Best call back number: [phone]    Chief complaint: ANOTHER  APPT THAT DAY    Type of visit: F/U 2     Requested date: CALL TO R/S      If rescheduling, when is the original appointment:10/30/2023  "

## 2023-10-26 ENCOUNTER — OFFICE VISIT (OUTPATIENT)
Dept: ORTHOPEDIC SURGERY | Facility: CLINIC | Age: 68
End: 2023-10-26
Payer: MEDICARE

## 2023-10-26 VITALS
DIASTOLIC BLOOD PRESSURE: 83 MMHG | SYSTOLIC BLOOD PRESSURE: 152 MMHG | BODY MASS INDEX: 25.05 KG/M2 | WEIGHT: 175 LBS | HEIGHT: 70 IN | OXYGEN SATURATION: 92 % | HEART RATE: 83 BPM

## 2023-10-26 DIAGNOSIS — M25.552 LEFT HIP PAIN: Primary | ICD-10-CM

## 2023-10-26 DIAGNOSIS — M70.62 TROCHANTERIC BURSITIS OF LEFT HIP: ICD-10-CM

## 2023-10-26 RX ORDER — LIDOCAINE HYDROCHLORIDE 10 MG/ML
5 INJECTION, SOLUTION EPIDURAL; INFILTRATION; INTRACAUDAL; PERINEURAL
Status: COMPLETED | OUTPATIENT
Start: 2023-10-26 | End: 2023-10-26

## 2023-10-26 RX ORDER — TRIAMCINOLONE ACETONIDE 40 MG/ML
40 INJECTION, SUSPENSION INTRA-ARTICULAR; INTRAMUSCULAR
Status: COMPLETED | OUTPATIENT
Start: 2023-10-26 | End: 2023-10-26

## 2023-10-26 RX ADMIN — LIDOCAINE HYDROCHLORIDE 5 ML: 10 INJECTION, SOLUTION EPIDURAL; INFILTRATION; INTRACAUDAL; PERINEURAL at 10:50

## 2023-10-26 RX ADMIN — TRIAMCINOLONE ACETONIDE 40 MG: 40 INJECTION, SUSPENSION INTRA-ARTICULAR; INTRAMUSCULAR at 10:50

## 2023-10-26 NOTE — PROGRESS NOTES
"Chief Complaint  Follow-up and Pain of the Left Hip     Subjective      Demetri Pratt presents to [facility] for follow up of the left hip. He had an injection on 23 that gave some relief.  He noted he has severe pain and locking up in the left hip.  There is pain on the lateral aspect of the hip and feels numbness on the back of his leg. He has had physical therapy in the past.     No Known Allergies     Social History     Socioeconomic History    Marital status:    Tobacco Use    Smoking status: Every Day     Packs/day: 1.50     Years: 50.00     Additional pack years: 0.00     Total pack years: 75.00     Types: Cigarettes     Start date: 1968     Last attempt to quit: 2019     Years since quittin.1    Smokeless tobacco: Never   Vaping Use    Vaping Use: Never used   Substance and Sexual Activity    Alcohol use: Never    Drug use: Never    Sexual activity: Yes     Partners: Female     Birth control/protection: None, Vasectomy        I reviewed the patient's chief complaint, history of present illness, review of systems, past medical history, surgical history, family history, social history, medications, and allergy list.     Review of Systems     Constitutional: Denies fevers, chills, weight loss  Cardiovascular: Denies chest pain, shortness of breath  Skin: Denies rashes, acute skin changes  Neurologic: Denies headache, loss of consciousness        Vital Signs:   /83   Pulse 83   Ht 177.8 cm (70\")   Wt 79.4 kg (175 lb)   SpO2 92%   BMI 25.11 kg/m²          Physical Exam  General: Alert. No acute distress    Ortho Exam        LEFT HIP  No skin discoloration, atrophy or swelling. Positive EHL, FHL, GS, and TA. Sensation intact to all 5 nerves of the foot. Positive straight leg raise. Leg lengths appear equal. Ambulates with Non-antalgic gait Negative Giovanni. Negative Chandu. Good strength to hamstrings, quadriceps, dorsiflexors, and plantar " flexors. Knee Extensor Mechanism  intact        Large Joint Arthrocentesis: L hip joint  Date/Time: 10/26/2023 10:50 AM  Consent given by: patient  Site marked: site marked  Timeout: Immediately prior to procedure a time out was called to verify the correct patient, procedure, equipment, support staff and site/side marked as required   Supporting Documentation  Indications: pain   Procedure Details  Location: hip - L hip joint  Needle gauge: 21g.  Medications administered: 5 mL lidocaine PF 1% 1 %; 40 mg triamcinolone acetonide 40 MG/ML  Patient tolerance: patient tolerated the procedure well with no immediate complications        Imaging Results (Most Recent)       None             Result Review :          Assessment and Plan     Diagnoses and all orders for this visit:    1. Left hip pain (Primary)    2. Trochanteric bursitis of left hip        Discussed the treatment plan with the patient.     Discussed the risks and benefits of conservative measures. The patient expressed understanding and wished to proceed with a left hip steroid injection.      HEP exercises.   .  Will X ray the left hip at next visit.     Educated on risk of smoking. Discussed options for smoking cessation. and Call or return if worsening symptoms.    Follow Up     PRN      Patient was given instructions and counseling regarding his condition or for health maintenance advice. Please see specific information pulled into the AVS if appropriate.     Scribed for Lex Arnold MD by Kassidy Harris MA.  10/26/23   10:40 EDT    I have personally performed the services described in this document as scribed by the above individual and it is both accurate and complete. Lex Arnold MD 10/26/23

## 2023-10-27 ENCOUNTER — HOSPITAL ENCOUNTER (OUTPATIENT)
Dept: CT IMAGING | Facility: HOSPITAL | Age: 68
Discharge: HOME | End: 2023-10-27
Admitting: INTERNAL MEDICINE
Payer: MEDICARE

## 2023-10-27 DIAGNOSIS — C18.2 MALIGNANT NEOPLASM OF ASCENDING COLON: ICD-10-CM

## 2023-10-27 LAB
CREAT BLDA-MCNC: 1.1 MG/DL
EGFRCR SERPLBLD CKD-EPI 2021: 73.1 ML/MIN/1.73

## 2023-10-27 PROCEDURE — 74177 CT ABD & PELVIS W/CONTRAST: CPT

## 2023-10-27 PROCEDURE — 25510000001 IOPAMIDOL PER 1 ML: Performed by: INTERNAL MEDICINE

## 2023-10-27 PROCEDURE — 71260 CT THORAX DX C+: CPT

## 2023-10-27 PROCEDURE — 82565 ASSAY OF CREATININE: CPT

## 2023-10-27 RX ADMIN — IOPAMIDOL 100 ML: 755 INJECTION, SOLUTION INTRAVENOUS at 10:22

## 2023-11-07 ENCOUNTER — OFFICE VISIT (OUTPATIENT)
Dept: ONCOLOGY | Facility: HOSPITAL | Age: 68
End: 2023-11-07
Payer: MEDICARE

## 2023-11-07 ENCOUNTER — LAB (OUTPATIENT)
Dept: ONCOLOGY | Facility: HOSPITAL | Age: 68
End: 2023-11-07
Payer: MEDICARE

## 2023-11-07 VITALS
HEART RATE: 70 BPM | BODY MASS INDEX: 25.94 KG/M2 | OXYGEN SATURATION: 97 % | RESPIRATION RATE: 18 BRPM | SYSTOLIC BLOOD PRESSURE: 157 MMHG | TEMPERATURE: 98.1 F | WEIGHT: 180.78 LBS | DIASTOLIC BLOOD PRESSURE: 80 MMHG

## 2023-11-07 DIAGNOSIS — C18.9 MALIGNANT NEOPLASM OF COLON, UNSPECIFIED PART OF COLON: ICD-10-CM

## 2023-11-07 DIAGNOSIS — C18.2 MALIGNANT NEOPLASM OF ASCENDING COLON: ICD-10-CM

## 2023-11-07 DIAGNOSIS — R91.1 LUNG NODULE: Primary | ICD-10-CM

## 2023-11-07 LAB
ALBUMIN SERPL-MCNC: 4.6 G/DL (ref 3.5–5.2)
ALBUMIN/GLOB SERPL: 2 G/DL
ALP SERPL-CCNC: 63 U/L (ref 39–117)
ALT SERPL W P-5'-P-CCNC: 13 U/L (ref 1–41)
ANION GAP SERPL CALCULATED.3IONS-SCNC: 9.1 MMOL/L (ref 5–15)
AST SERPL-CCNC: 11 U/L (ref 1–40)
BASOPHILS # BLD AUTO: 0.04 10*3/MM3 (ref 0–0.2)
BASOPHILS NFR BLD AUTO: 0.3 % (ref 0–1.5)
BILIRUB SERPL-MCNC: 0.4 MG/DL (ref 0–1.2)
BUN SERPL-MCNC: 13 MG/DL (ref 8–23)
BUN/CREAT SERPL: 12.6 (ref 7–25)
CALCIUM SPEC-SCNC: 9.2 MG/DL (ref 8.6–10.5)
CHLORIDE SERPL-SCNC: 104 MMOL/L (ref 98–107)
CO2 SERPL-SCNC: 28.9 MMOL/L (ref 22–29)
CREAT SERPL-MCNC: 1.03 MG/DL (ref 0.76–1.27)
DEPRECATED RDW RBC AUTO: 48.5 FL (ref 37–54)
EGFRCR SERPLBLD CKD-EPI 2021: 79.1 ML/MIN/1.73
EOSINOPHIL # BLD AUTO: 0.21 10*3/MM3 (ref 0–0.4)
EOSINOPHIL NFR BLD AUTO: 1.7 % (ref 0.3–6.2)
ERYTHROCYTE [DISTWIDTH] IN BLOOD BY AUTOMATED COUNT: 13.2 % (ref 12.3–15.4)
GLOBULIN UR ELPH-MCNC: 2.3 GM/DL
GLUCOSE SERPL-MCNC: 98 MG/DL (ref 65–99)
HCT VFR BLD AUTO: 46 % (ref 37.5–51)
HGB BLD-MCNC: 15.6 G/DL (ref 13–17.7)
IMM GRANULOCYTES # BLD AUTO: 0.02 10*3/MM3 (ref 0–0.05)
IMM GRANULOCYTES NFR BLD AUTO: 0.2 % (ref 0–0.5)
LYMPHOCYTES # BLD AUTO: 2.53 10*3/MM3 (ref 0.7–3.1)
LYMPHOCYTES NFR BLD AUTO: 20.7 % (ref 19.6–45.3)
MCH RBC QN AUTO: 33.2 PG (ref 26.6–33)
MCHC RBC AUTO-ENTMCNC: 33.9 G/DL (ref 31.5–35.7)
MCV RBC AUTO: 97.9 FL (ref 79–97)
MONOCYTES # BLD AUTO: 1.13 10*3/MM3 (ref 0.1–0.9)
MONOCYTES NFR BLD AUTO: 9.2 % (ref 5–12)
NEUTROPHILS NFR BLD AUTO: 67.9 % (ref 42.7–76)
NEUTROPHILS NFR BLD AUTO: 8.29 10*3/MM3 (ref 1.7–7)
PLATELET # BLD AUTO: 217 10*3/MM3 (ref 140–450)
PMV BLD AUTO: 9.6 FL (ref 6–12)
POTASSIUM SERPL-SCNC: 3.7 MMOL/L (ref 3.5–5.2)
PROT SERPL-MCNC: 6.9 G/DL (ref 6–8.5)
RBC # BLD AUTO: 4.7 10*6/MM3 (ref 4.14–5.8)
SODIUM SERPL-SCNC: 142 MMOL/L (ref 136–145)
WBC NRBC COR # BLD: 12.22 10*3/MM3 (ref 3.4–10.8)

## 2023-11-07 PROCEDURE — G0463 HOSPITAL OUTPT CLINIC VISIT: HCPCS | Performed by: INTERNAL MEDICINE

## 2023-11-07 PROCEDURE — 80053 COMPREHEN METABOLIC PANEL: CPT

## 2023-11-07 PROCEDURE — 36415 COLL VENOUS BLD VENIPUNCTURE: CPT

## 2023-11-07 PROCEDURE — 85025 COMPLETE CBC W/AUTO DIFF WBC: CPT

## 2023-11-07 NOTE — PROGRESS NOTES
Chief Complaint  Malignant neoplasm of ascending colon    Umer Antonio MD Preston, Steven H, MD    Subjective          Demetri Jamin Pratt presents to [facility] for right sided colon cancer    Mr. Pratt is a pleasant 67-year-old presenting with his wife for new oncology evaluation diagnosis of right-sided colon cancer.  He has a past medical history of AAA status post repair.  He is doing well since last visit. Weight is up. Bowels are regulated. He does have a pill to use if he has diarrhea. No acute complaints. .     Oncology/Hematology History Overview Note   12/29/22: CTA of abdomen (prior history of AAA) demonstrated masslike circumferential wall thickening of the cecum and proximal ascending colon. CEA 4.2 (wnl)    1/30/23: Lap converted to open Right hemicolectomy with abdominal wall resection, omentectomy at the [facility]. 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  Tumor directly invades adjacent organ or structure: abdominal wall, no perforation, LVI and PNI both present.  All margins negative for invasive carcinoma. All 21 regional lymph nodes negative for tumor: Staged at pT4b pN0.     3/2/23: Jefferson (1st ctDNA test): negative    3/11/23: C1D1 Capecitabine dosed at 2000 mg in AM and 2000 mg in PM. Complicated by headache and gum and throat pain. Decrease to 1500 mg AM and 1500 mg PM on 3/17/23.     3/30/23: CT CAP with contrast: negative for disease/mets    4/7/23: Planned C2 1500 mg BID capecitabine start. Discontinued after 1.5 weeks due to side effects. Will not plan any further dose reduction.     4/28/23: Jefferson negative    10/27/23: CT CAP: SILVINO. CEA 2.5       Malignant neoplasm of ascending colon   1/30/2023 Cancer Staged    Staging form: Colon And Rectum, AJCC 8th Edition  - Pathologic stage from 1/30/2023: Stage IIC (pT4b, pN0, cM0)  - Signed by Clarence Rose MD on 2023 Initial Diagnosis    Malignant neoplasm of ascending colon (HCC)     2023 -  Chemotherapy    OP COLORECTAL Capecitabine (Dose Selection Required) BID (8 cycles)           Review of Systems   Constitutional:  Negative for fatigue.   Respiratory:  Negative for chest tightness.    Neurological:  Negative for headache.   All other systems reviewed and are negative.    Current Outpatient Medications on File Prior to Visit   Medication Sig Dispense Refill    [DISCONTINUED] Diphenhydramine-Aluminum-Magnesium-Simethicone-Lidocaine-Nystatin Take 5 mL by mouth Every 4 (Four) Hours As Needed (mucositis and stomatitis). Swish and spit 5 ml every 4 hours as needed for mucositis or stomatitis (Patient not taking: Reported on 2023) 256 mL 2    [DISCONTINUED] ondansetron (ZOFRAN) 8 MG tablet Take 1 tablet by mouth 3 (Three) Times a Day As Needed for Nausea or Vomiting. (Patient not taking: Reported on 2023) 30 tablet 5     No current facility-administered medications on file prior to visit.       No Known Allergies  Past Medical History:   Diagnosis Date    AAA (abdominal aortic aneurysm) without rupture     Bursitis of hip     Hip arthrosis     Will be my second visit, was prescribed a medicine. That has not helped. Looking for next step, maybe injection.     Past Surgical History:   Procedure Laterality Date    ABDOMINAL AORTIC ANEURYSM REPAIR  2019    VASECTOMY  1994     Social History     Socioeconomic History    Marital status:    Tobacco Use    Smoking status: Every Day     Packs/day: 1.50     Years: 50.00     Additional pack years: 0.00     Total pack years: 75.00     Types: Cigarettes     Start date: 1968     Last attempt to quit: 2019     Years since quittin.1    Smokeless tobacco: Never   Vaping Use    Vaping Use: Never used   Substance and Sexual Activity    Alcohol use: Never    Drug use: Never    Sexual  "activity: Yes     Partners: Female     Birth control/protection: None, Vasectomy     Family History   Problem Relation Age of Onset    No Known Problems Other     Diabetes Mother         Passed    Cancer Father         Passed       Objective   Physical Exam  Well appearing patient in no acute distress on RA  Anicteric sclerae, no rash on exposed skin  Respirations non-labored  Awake, alert, and oriented x 4. Speech intact. No neurologic deficit  Appropriate mood and affect      Vitals:    11/07/23 0804   BP: 157/80   Pulse: 70   Resp: 18   Temp: 98.1 °F (36.7 °C)   TempSrc: Temporal   SpO2: 97%   Weight: 82 kg (180 lb 12.4 oz)   PainSc: 0-No pain       ECOG score: 0         PHQ-9 Total Score:             Result Review :   The following data was reviewed by: Clarence Rose MD on 11/07/23:  Lab Results   Component Value Date    HGB 14.6 04/28/2023    HCT 42.0 04/28/2023    MCV 95.0 04/28/2023     04/28/2023    WBC 7.03 04/28/2023    NEUTROABS 3.41 04/28/2023    LYMPHSABS 2.41 04/28/2023    MONOSABS 0.84 04/28/2023    EOSABS 0.30 04/28/2023    BASOSABS 0.06 04/28/2023     Lab Results   Component Value Date    GLUCOSE 105 (H) 04/28/2023    BUN 19 04/28/2023    CREATININE 1.10 10/27/2023     04/28/2023    K 4.3 04/28/2023     (H) 04/28/2023    CO2 22.0 04/28/2023    CALCIUM 9.0 04/28/2023    PROTEINTOT 6.4 04/28/2023    ALBUMIN 4.2 04/28/2023    BILITOT 0.3 04/28/2023    ALKPHOS 57 04/28/2023    AST 14 04/28/2023    ALT 11 04/28/2023     No results found for: \"MG\", \"PHOS\", \"FREET4\", \"TSH\"    Labs personally reviewed. CBC normal.     CEA level personally reviewed: 2.5    10/27/23 CT Abd/pelvis personally reviewed and no evidence of disease per my independent read.     CT Abdomen Pelvis With Contrast    Result Date: 10/27/2023    1. No evidence of metastatic disease in the abdomen or pelvis 2. Previous right hemicolectomy 3. Moderate left renal atrophy 4. Previous endovascular repair of an " abdominal aortic aneurysm.  The left limb of the stent graft is thrombosed, unchanged in the interval     Bobby Sheikh M.D.       Electronically Signed and Approved By: Bobby Sheikh M.D. on 10/27/2023 at 15:55             CT Chest With Contrast Diagnostic    Result Date: 10/27/2023    1. Stable 0.4 cm nodule in the left upper lobe.  Consider follow-up chest CT in 6 months to re-evaluate. 2. Mild emphysematous changes     Bobby Sheikh M.D.       Electronically Signed and Approved By: Bobby Sheikh M.D. on 10/27/2023 at 14:12                        Assessment and Plan    Diagnoses and all orders for this visit:    1. Lung nodule (Primary)    2. Malignant neoplasm of ascending colon  -     CEA; Future  -     CT chest w contrast; Future  -     CT abdomen pelvis w contrast; Future      Stage II colon adenocarcinoma    Mr Pratt has high risk stage II colon adenocarcinoma s/p right hemicolectomy on 1/30/23 with pathology showing [id number] with LVI and PNI. 0/21 lymph nodes involved. G3, poorly differentiated. Baseline CEA negative.  Due to high risk stage II colon cancer with the T4 stage as well LVI and PNI, adjuvant chemotherapy was recommended. Patient was not interested in IV chemo or getting a port, so he was started on single agent oral capecitabine. Patient was unable to tolerate this even after dose reduction with Cycle 2. He did not resume. Will therefore hold further chemotherapy and plan for surveillance.  Baseline ctDNA with Jefferson completed 3/2/23 and negative. Repeat 4/2023 also negative. This provides support that cancer is in remission. Repeat Jefferson 11/2023 ordered and pending.  Repeat CT staging imaging 10/27/23 with no evidence of disease. Repeat in 6 months with CEA and Jefferson. Patient will need repeat colonoscopy 1 year out from surgery.     SEAN nodule  0.4 cm. Repeat CT chest in 6 months.       Patient Follow Up: 6 months with scans  Patient was given instructions and counseling regarding his  condition or for health maintenance advice. Please see specific information pulled into the AVS if appropriate.

## 2024-04-02 ENCOUNTER — OFFICE VISIT (OUTPATIENT)
Dept: ORTHOPEDIC SURGERY | Facility: CLINIC | Age: 69
End: 2024-04-02
Payer: MEDICARE

## 2024-04-02 VITALS
OXYGEN SATURATION: 93 % | HEART RATE: 66 BPM | WEIGHT: 205 LBS | HEIGHT: 70 IN | BODY MASS INDEX: 29.35 KG/M2 | DIASTOLIC BLOOD PRESSURE: 91 MMHG | SYSTOLIC BLOOD PRESSURE: 168 MMHG

## 2024-04-02 DIAGNOSIS — M25.552 LEFT HIP PAIN: Primary | ICD-10-CM

## 2024-04-02 PROCEDURE — 1160F RVW MEDS BY RX/DR IN RCRD: CPT

## 2024-04-02 PROCEDURE — 1159F MED LIST DOCD IN RCRD: CPT

## 2024-04-02 PROCEDURE — 99213 OFFICE O/P EST LOW 20 MIN: CPT

## 2024-04-02 RX ORDER — DICLOFENAC SODIUM 75 MG/1
75 TABLET, DELAYED RELEASE ORAL 2 TIMES DAILY
Qty: 60 TABLET | Refills: 2 | Status: SHIPPED | OUTPATIENT
Start: 2024-04-02

## 2024-04-02 RX ORDER — METHYLPREDNISOLONE 4 MG/1
16 TABLET ORAL DAILY
Qty: 21 TABLET | Refills: 0 | Status: SHIPPED | OUTPATIENT
Start: 2024-04-02

## 2024-04-15 NOTE — PATIENT INSTRUCTIONS
X-rays taken and reviewed with patient.  Discussed treatment plan of care with patient.    Discussed conservative measures or repeat injection.  Patient declines injection today.  Discussed oral steroid and Medrol Dosepak was sent to the pharmacy.  Diclofenac sent to pharmacy as well.    Patient elects to follow-up as needed for worsening symptoms or failure to improve.  Call for questions or concerns.

## 2024-04-15 NOTE — PROGRESS NOTES
"Chief Complaint  Follow-up of the Left Hip    Subjective      Demetri Pratt presents to [facility] for follow-up of left hip pain.  Patient had an injection on 10/26/2023 and reports that the second injection did not give him any relief.  He also attended physical therapy but reports that it did not help either.  He continues to have aching and burning.  He is ambulatory with antalgic gait and has difficulty with prolonged ambulation.    Objective   No Known Allergies    Vital Signs:   /91   Pulse 66   Ht 177.8 cm (70\")   Wt 93 kg (205 lb)   SpO2 93%   BMI 29.41 kg/m²     Please follow-up with PCP regarding blood pressure.    Physical Exam    Constitutional: Awake, alert. Well nourished appearance.    Integumentary: Warm, dry, intact. No obvious rashes.    HENT: Atraumatic, normocephalic.   Respiratory: Non labored respirations .   Cardiovascular: Intact peripheral pulses.    Psychiatric: Normal mood and affect. A&O X3    Ortho Exam  Left hip: Tender to palpation of the greater trochanter.  No pain elicited with internal and external rotation of the hip.  No groin pain noted.  5/5 strength to hip flexors, extensors, abductors and adductor's.  Neurovascular intact.  Sensation is intact.    Imaging Results (Most Recent)       Procedure Component Value Units Date/Time    XR Hip With or Without Pelvis 2 - 3 View Left [[id number]] Resulted: 04/02/24 1444     Updated: 04/02/24 1444    Narrative:      X-Ray Report:  Study: X-rays ordered, taken in the office, and reviewed today.   Site: Left hip xray  Indication: Left hip pain  View: AP/Lateral view(s)  Findings: Mild degenerative changes noted.  No acute fractures or osseous   abnormalities.  No dislocation noted.  Prior studies available for comparison: yes                       Assessment and Plan   Problem List Items Addressed This Visit    None  Visit Diagnoses       Left hip pain    -  Primary    Relevant Orders    XR " Hip With or Without Pelvis 2 - 3 View Left (Completed)            Follow Up   Return if symptoms worsen or fail to improve.    Patient is a smoker, please discuss smoking cessation options with your PCP.    Social History     Socioeconomic History    Marital status:    Tobacco Use    Smoking status: Every Day     Current packs/day: 0.00     Average packs/day: 1.5 packs/day for 51.7 years (77.6 ttl pk-yrs)     Types: Cigarettes     Start date: 1968     Last attempt to quit: 2019     Years since quittin.5    Smokeless tobacco: Never   Vaping Use    Vaping status: Never Used   Substance and Sexual Activity    Alcohol use: Never    Drug use: Never    Sexual activity: Yes     Partners: Female     Birth control/protection: None, Vasectomy       Patient Instructions   X-rays taken and reviewed with patient.  Discussed treatment plan of care with patient.    Discussed conservative measures or repeat injection.  Patient declines injection today.  Discussed oral steroid and Medrol Dosepak was sent to the pharmacy.  Diclofenac sent to pharmacy as well.    Patient elects to follow-up as needed for worsening symptoms or failure to improve.  Call for questions or concerns.    Patient was given instructions and counseling regarding his condition or for health maintenance advice. Please see specific information pulled into the AVS if appropriate.

## 2024-05-07 ENCOUNTER — HOSPITAL ENCOUNTER (OUTPATIENT)
Dept: CT IMAGING | Facility: HOSPITAL | Age: 69
Discharge: HOME | End: 2024-05-07
Admitting: INTERNAL MEDICINE
Payer: MEDICARE

## 2024-05-07 DIAGNOSIS — C18.2 MALIGNANT NEOPLASM OF ASCENDING COLON: ICD-10-CM

## 2024-05-07 LAB
CREAT BLDA-MCNC: 1.2 MG/DL (ref 0.6–1.3)
EGFRCR SERPLBLD CKD-EPI 2021: 65.5 ML/MIN/1.73

## 2024-05-07 PROCEDURE — 82565 ASSAY OF CREATININE: CPT

## 2024-05-07 PROCEDURE — 25510000001 IOPAMIDOL PER 1 ML: Performed by: INTERNAL MEDICINE

## 2024-05-07 PROCEDURE — 74177 CT ABD & PELVIS W/CONTRAST: CPT

## 2024-05-07 PROCEDURE — 71260 CT THORAX DX C+: CPT

## 2024-05-07 RX ADMIN — IOPAMIDOL 100 ML: 755 INJECTION, SOLUTION INTRAVENOUS at 09:28

## 2024-05-13 ENCOUNTER — LAB (OUTPATIENT)
Dept: ONCOLOGY | Facility: HOSPITAL | Age: 69
End: 2024-05-13
Payer: MEDICARE

## 2024-05-13 ENCOUNTER — OFFICE VISIT (OUTPATIENT)
Dept: ONCOLOGY | Facility: HOSPITAL | Age: 69
End: 2024-05-13
Payer: MEDICARE

## 2024-05-13 VITALS
TEMPERATURE: 98 F | DIASTOLIC BLOOD PRESSURE: 83 MMHG | RESPIRATION RATE: 18 BRPM | WEIGHT: 201.2 LBS | BODY MASS INDEX: 28.87 KG/M2 | HEART RATE: 63 BPM | SYSTOLIC BLOOD PRESSURE: 156 MMHG | OXYGEN SATURATION: 97 %

## 2024-05-13 DIAGNOSIS — C18.2 MALIGNANT NEOPLASM OF ASCENDING COLON: Primary | ICD-10-CM

## 2024-05-13 DIAGNOSIS — L98.9 CHEST SKIN LESION: ICD-10-CM

## 2024-05-13 DIAGNOSIS — R91.1 PULMONARY NODULE: ICD-10-CM

## 2024-05-13 DIAGNOSIS — C18.2 MALIGNANT NEOPLASM OF ASCENDING COLON: ICD-10-CM

## 2024-05-13 LAB — CEA SERPL-MCNC: 3.61 NG/ML

## 2024-05-13 PROCEDURE — 82378 CARCINOEMBRYONIC ANTIGEN: CPT

## 2024-05-13 PROCEDURE — 1126F AMNT PAIN NOTED NONE PRSNT: CPT | Performed by: INTERNAL MEDICINE

## 2024-05-13 PROCEDURE — G0463 HOSPITAL OUTPT CLINIC VISIT: HCPCS | Performed by: INTERNAL MEDICINE

## 2024-05-13 PROCEDURE — G2211 COMPLEX E/M VISIT ADD ON: HCPCS | Performed by: INTERNAL MEDICINE

## 2024-05-13 PROCEDURE — 36415 COLL VENOUS BLD VENIPUNCTURE: CPT

## 2024-05-13 PROCEDURE — 99214 OFFICE O/P EST MOD 30 MIN: CPT | Performed by: INTERNAL MEDICINE

## 2024-05-13 RX ORDER — MONTELUKAST SODIUM 4 MG/1
TABLET, CHEWABLE ORAL
COMMUNITY
Start: 2024-04-17

## 2024-05-13 NOTE — PROGRESS NOTES
Chief Complaint  Malignant neoplasm of ascending colon    Umer Antonio MD Preston, Steven H, MD    Subjective          Demetri Jamin Pratt presents to [facility] for right sided colon cancer    Mr. Pratt is a pleasant 68-year-old presenting with his wife for follow up of right-sided colon cancer.  He has a past medical history of AAA status post repair.  He is doing well since last visit. Denies abdominal pain or bleeding. Bowels are regulated. He does have a pill to use if he has diarrhea which helps. Had colonoscopy in [place] in April and this was benign per his report with two small polyps, repeat in 3 years. Has a couple of skin lesions upper left chest, asking for referral to dermatology. No acute complaints.  Results of CT scans discussed with patient, no evidence of disease.     Oncology/Hematology History Overview Note   12/29/22: CTA of abdomen (prior history of AAA) demonstrated masslike circumferential wall thickening of the cecum and proximal ascending colon. CEA 4.2 (wnl)    1/30/23: Lap converted to open Right hemicolectomy with abdominal wall resection, omentectomy at the [facility]. 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  Tumor directly invades adjacent organ or structure: abdominal wall, no perforation, LVI and PNI both present.  All margins negative for invasive carcinoma. All 21 regional lymph nodes negative for tumor: Staged at pT4b pN0.     3/2/23: Jefferson (1st ctDNA test): negative    3/11/23: C1D1 Capecitabine dosed at 2000 mg in AM and 2000 mg in PM. Complicated by headache and gum and throat pain. Decrease to 1500 mg AM and 1500 mg PM on 3/17/23.     3/30/23: CT CAP with contrast: negative for disease/mets    4/7/23: Planned C2 1500 mg BID capecitabine start. Discontinued after 1.5 weeks due to side effects. Will not plan  any further dose reduction.     4/28/23: Jefferson negative    10/27/23: CT CAP: SILVINO. CEA 2.5       Malignant neoplasm of ascending colon   1/30/2023 Cancer Staged    Staging form: Colon And Rectum, AJCC 8th Edition  - Pathologic stage from 1/30/2023: Stage IIC (pT4b, pN0, cM0) - Signed by Clarence Rose MD on 2/28/2023 2/28/2023 Initial Diagnosis    Malignant neoplasm of ascending colon (HCC)     2/28/2023 -  Chemotherapy    OP COLORECTAL Capecitabine (Dose Selection Required) BID (8 cycles)           Review of Systems   Constitutional:  Negative for appetite change, diaphoresis, fatigue, fever, unexpected weight gain and unexpected weight loss.   HENT:  Negative for hearing loss, sore throat and voice change.    Eyes:  Negative for blurred vision, double vision, pain, redness and visual disturbance.   Respiratory:  Negative for cough, chest tightness, shortness of breath and wheezing.    Cardiovascular:  Negative for chest pain, palpitations and leg swelling.   Endocrine: Negative for cold intolerance, heat intolerance, polydipsia and polyuria.   Genitourinary:  Negative for decreased urine volume, difficulty urinating, frequency and urinary incontinence.   Musculoskeletal:  Negative for arthralgias, back pain, joint swelling and myalgias.   Skin:  Negative for color change, rash, skin lesions and wound.   Neurological:  Negative for dizziness, seizures, numbness and headache.   Hematological:  Negative for adenopathy. Does not bruise/bleed easily.   Psychiatric/Behavioral:  Negative for depressed mood. The patient is not nervous/anxious.    All other systems reviewed and are negative.    Current Outpatient Medications on File Prior to Visit   Medication Sig Dispense Refill    colestipol (COLESTID) 1 g tablet TAKE 1 TABLET BY MOUTH TWICE DAILY AFTER MEALS. TAKE AT LEAST 1 HOUR AFTER OR 4 HOURS BEFORE OTHER MEDICATIONS      diclofenac (VOLTAREN) 75 MG EC tablet Take 1 tablet by mouth 2 (Two) Times a Day.  60 tablet 2    methylPREDNISolone (MEDROL) 4 MG dose pack Take 16 tablets by mouth Daily. Use as directed by package instructions 21 tablet 0     No current facility-administered medications on file prior to visit.       No Known Allergies  Past Medical History:   Diagnosis Date    AAA (abdominal aortic aneurysm) without rupture     Bursitis of hip     Hip arthrosis     Will be my second visit, was prescribed a medicine. That has not helped. Looking for next step, maybe injection.     Past Surgical History:   Procedure Laterality Date    ABDOMINAL AORTIC ANEURYSM REPAIR      VASECTOMY       Social History     Socioeconomic History    Marital status:    Tobacco Use    Smoking status: Every Day     Current packs/day: 0.00     Average packs/day: 1.5 packs/day for 51.7 years (77.6 ttl pk-yrs)     Types: Cigarettes     Start date: 1968     Last attempt to quit: 2019     Years since quittin.6    Smokeless tobacco: Never   Vaping Use    Vaping status: Never Used   Substance and Sexual Activity    Alcohol use: Never    Drug use: Never    Sexual activity: Yes     Partners: Female     Birth control/protection: None, Vasectomy     Family History   Problem Relation Age of Onset    No Known Problems Other     Diabetes Mother         Passed    Cancer Father         Passed       Objective   Physical Exam  Well appearing patient in no acute distress on RA  Anicteric sclerae, no rash on exposed skin, benign appearing SK on upper left chest  Respirations non-labored  Awake, alert, and oriented x 4. Speech intact. No neurologic deficit  Appropriate mood and affect      Vitals:    24 0818   BP: 156/83   Pulse: 63   Resp: 18   Temp: 98 °F (36.7 °C)   TempSrc: Temporal   SpO2: 97%   Weight: 91.3 kg (201 lb 3.2 oz)   PainSc: 0-No pain                   PHQ-9 Total Score:             Result Review :   The following data was reviewed by: Clarence Rose MD on 24:  Lab Results  "  Component Value Date    HGB 15.6 11/07/2023    HCT 46.0 11/07/2023    MCV 97.9 (H) 11/07/2023     11/07/2023    WBC 12.22 (H) 11/07/2023    NEUTROABS 8.29 (H) 11/07/2023    LYMPHSABS 2.53 11/07/2023    MONOSABS 1.13 (H) 11/07/2023    EOSABS 0.21 11/07/2023    BASOSABS 0.04 11/07/2023     Lab Results   Component Value Date    GLUCOSE 98 11/07/2023    BUN 13 11/07/2023    CREATININE 1.20 05/07/2024     11/07/2023    K 3.7 11/07/2023     11/07/2023    CO2 28.9 11/07/2023    CALCIUM 9.2 11/07/2023    PROTEINTOT 6.9 11/07/2023    ALBUMIN 4.6 11/07/2023    BILITOT 0.4 11/07/2023    ALKPHOS 63 11/07/2023    AST 11 11/07/2023    ALT 13 11/07/2023     No results found for: \"MG\", \"PHOS\", \"FREET4\", \"TSH\"    Labs personally reviewed. CBC normal.     CEA level personally reviewed    5/7/24  CT Abd/pelvis personally reviewed and no masses or lesions per my independent read. Aortic stent seen.    CT Chest With Contrast Diagnostic    Result Date: 5/7/2024  1. No convincing evidence of recurrent malignancy or metastatic disease in the chest, abdomen or pelvis. 2. Right hemicolectomy. 3. 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 4. Stable 4 mm subpleural nodule in the posterior left upper lobe since 3/3/2023. Benign etiology is favored.  Electronically Signed By-Valencia Muir MD On:5/7/2024 3:32 PM      CT Abdomen Pelvis With Contrast    Result Date: 5/7/2024  1. No convincing evidence of recurrent malignancy or metastatic disease in the chest, abdomen or pelvis. 2. Right hemicolectomy. 3. 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 4. Stable 4 mm subpleural nodule in the posterior left upper lobe since 3/3/2023. Benign etiology is favored.  Electronically Signed By-Valencia Muir MD On:5/7/2024 3:32 PM                 Assessment and Plan    Diagnoses and all orders for this visit:    1. Malignant neoplasm of ascending colon (Primary)  -     " Jefferson Signatera - Other,; Standing  -     CEA; Future  -     CT chest w contrast; Future  -     CT abdomen pelvis w contrast; Future    2. Pulmonary nodule    3. Chest skin lesion  -     Ambulatory Referral to Dermatology      Stage II colon adenocarcinoma    Mr Pratt has high risk stage II colon adenocarcinoma s/p right hemicolectomy on 1/30/23 with pathology showing [id number] with LVI and PNI. 0/21 lymph nodes involved. G3, poorly differentiated. Baseline CEA negative.  Due to high risk stage II colon cancer with the T4 stage as well LVI and PNI, adjuvant chemotherapy was recommended. Patient was not interested in IV chemo or getting a port, so he was started on single agent oral capecitabine. Patient was unable to tolerate this even after dose reduction with Cycle 2. He did not resume. Further chemotherapy held. Will continue with surveillance.  Baseline ctDNA with Jefferson completed 3/2/23 and negative. Repeat 4/2023 also negative. This provides support that cancer is in remission. Repeat Jefferson 11/2023 also negative. Repeat CT staging imaging 5/7/24 with no evidence of disease. Repeat in 6 months with CEA and Jefferson. Had repeat c-scope 4/2024 that was reportedly benign with 2 benign polyps, repeat 3 years.     SEAN nodule  0.4 cm. Stable on recent scans 5/7/24. Stable since 3/2023. Most certainly benign. Repeat CT chest in 6 months per cancer surveillance as above.     SK  On chest. Refer to derm.       Patient Follow Up: 6 months with scans  Patient was given instructions and counseling regarding his condition or for health maintenance advice. Please see specific information pulled into the AVS if appropriate.

## 2024-07-08 RX ORDER — DICLOFENAC SODIUM 75 MG/1
75 TABLET, DELAYED RELEASE ORAL 2 TIMES DAILY
Qty: 60 TABLET | Refills: 2 | Status: SHIPPED | OUTPATIENT
Start: 2024-07-08

## 2024-11-13 ENCOUNTER — HOSPITAL ENCOUNTER (OUTPATIENT)
Dept: CT IMAGING | Facility: HOSPITAL | Age: 69
Discharge: HOME | End: 2024-11-13
Admitting: INTERNAL MEDICINE
Payer: MEDICARE

## 2024-11-13 DIAGNOSIS — C18.2 MALIGNANT NEOPLASM OF ASCENDING COLON: ICD-10-CM

## 2024-11-13 LAB
CREAT BLDA-MCNC: 1 MG/DL (ref 0.6–1.3)
EGFRCR SERPLBLD CKD-EPI 2021: 81.5 ML/MIN/1.73

## 2024-11-13 PROCEDURE — 71260 CT THORAX DX C+: CPT

## 2024-11-13 PROCEDURE — 74177 CT ABD & PELVIS W/CONTRAST: CPT

## 2024-11-13 PROCEDURE — 25510000001 IOPAMIDOL PER 1 ML: Performed by: INTERNAL MEDICINE

## 2024-11-13 PROCEDURE — 82565 ASSAY OF CREATININE: CPT

## 2024-11-13 RX ORDER — IOPAMIDOL 755 MG/ML
100 INJECTION, SOLUTION INTRAVASCULAR
Status: COMPLETED | OUTPATIENT
Start: 2024-11-13 | End: 2024-11-13

## 2024-11-13 RX ADMIN — IOPAMIDOL 100 ML: 755 INJECTION, SOLUTION INTRAVENOUS at 09:59

## 2024-11-15 ENCOUNTER — LAB (OUTPATIENT)
Dept: ONCOLOGY | Facility: HOSPITAL | Age: 69
End: 2024-11-15
Payer: MEDICARE

## 2024-11-15 ENCOUNTER — OFFICE VISIT (OUTPATIENT)
Dept: ONCOLOGY | Facility: HOSPITAL | Age: 69
End: 2024-11-15
Payer: MEDICARE

## 2024-11-15 VITALS
HEIGHT: 70 IN | RESPIRATION RATE: 16 BRPM | TEMPERATURE: 97.3 F | SYSTOLIC BLOOD PRESSURE: 153 MMHG | DIASTOLIC BLOOD PRESSURE: 88 MMHG | HEART RATE: 74 BPM | OXYGEN SATURATION: 94 % | BODY MASS INDEX: 28.41 KG/M2 | WEIGHT: 198.41 LBS

## 2024-11-15 DIAGNOSIS — R91.1 LUNG NODULE: ICD-10-CM

## 2024-11-15 DIAGNOSIS — M25.552 LEFT HIP PAIN: ICD-10-CM

## 2024-11-15 DIAGNOSIS — C18.2 MALIGNANT NEOPLASM OF ASCENDING COLON: ICD-10-CM

## 2024-11-15 DIAGNOSIS — C18.2 MALIGNANT NEOPLASM OF ASCENDING COLON: Primary | ICD-10-CM

## 2024-11-15 LAB — CEA SERPL-MCNC: 4.4 NG/ML

## 2024-11-15 PROCEDURE — 82378 CARCINOEMBRYONIC ANTIGEN: CPT

## 2024-11-15 PROCEDURE — 36415 COLL VENOUS BLD VENIPUNCTURE: CPT

## 2024-11-15 NOTE — PROGRESS NOTES
Chief Complaint  FOLLOW UP 2     Umer Antonio MD Preston, Steven H, MD    Subjective          Demetri Jamin Pratt presents to [facility] for right sided colon cancer    Mr. Pratt is a pleasant 68-year-old presenting with his wife for follow up of right-sided colon cancer.  He has a past medical history of AAA status post repair.  He is doing well since last visit. Denies persistent or worsening abdominal pain. Denies bleeding. Bowels are regulated.  Had colonoscopy in [place] in April and this was benign per his report with two small polyps, repeat in 3 years. Had spot removed on face by derm since last visit. Having left hip pain, hoping to see ortho for this. Jefferson in May positive. Repeat CT scans 11/13/24 unfortunately with new peritoneal disease. No acute complaints.      Oncology/Hematology History Overview Note   12/29/22: CTA of abdomen (prior history of AAA) demonstrated masslike circumferential wall thickening of the cecum and proximal ascending colon. CEA 4.2 (wnl)    1/30/23: Lap converted to open Right hemicolectomy with abdominal wall resection, omentectomy at the [facility]. 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  Tumor directly invades adjacent organ or structure: abdominal wall, no perforation, LVI and PNI both present.  All margins negative for invasive carcinoma. All 21 regional lymph nodes negative for tumor: Staged at pT4b pN0.     3/2/23: Jefferson (1st ctDNA test): negative    3/11/23: C1D1 Capecitabine dosed at 2000 mg in AM and 2000 mg in PM. Complicated by headache and gum and throat pain. Decrease to 1500 mg AM and 1500 mg PM on 3/17/23.     3/30/23: CT CAP with contrast: negative for disease/mets    4/7/23: Planned C2 1500 mg BID capecitabine start. Discontinued after 1.5 weeks due to side effects. Will not plan any further  dose reduction.     4/28/23: Jefferson negative    10/27/23: CT CAP: SILVINO. CEA 2.5       Malignant neoplasm of ascending colon   1/30/2023 Cancer Staged    Staging form: Colon And Rectum, AJCC 8th Edition  - Pathologic stage from 1/30/2023: Stage IIC (pT4b, pN0, cM0) - Signed by Clarence Rose MD on 2/28/2023 2/28/2023 Initial Diagnosis    Malignant neoplasm of ascending colon (HCC)     2/28/2023 -  Chemotherapy    OP COLORECTAL Capecitabine (Dose Selection Required) BID (8 cycles)           Review of Systems   Constitutional:  Negative for appetite change, diaphoresis, fatigue, fever, unexpected weight gain and unexpected weight loss.   HENT:  Negative for hearing loss, sore throat and voice change.    Eyes:  Negative for blurred vision, double vision, pain, redness and visual disturbance.   Respiratory:  Negative for cough, chest tightness, shortness of breath and wheezing.    Cardiovascular:  Negative for chest pain, palpitations and leg swelling.   Endocrine: Negative for cold intolerance, heat intolerance, polydipsia and polyuria.   Genitourinary:  Negative for decreased urine volume, difficulty urinating, frequency and urinary incontinence.   Musculoskeletal:  Negative for arthralgias, back pain, joint swelling and myalgias.   Skin:  Negative for color change, rash, skin lesions and wound.   Neurological:  Negative for dizziness, seizures, numbness and headache.   Hematological:  Negative for adenopathy. Does not bruise/bleed easily.   Psychiatric/Behavioral:  Negative for depressed mood. The patient is not nervous/anxious.    All other systems reviewed and are negative.    Current Outpatient Medications on File Prior to Visit   Medication Sig Dispense Refill    colestipol (COLESTID) 1 g tablet TAKE 1 TABLET BY MOUTH TWICE DAILY AFTER MEALS. TAKE AT LEAST 1 HOUR AFTER OR 4 HOURS BEFORE OTHER MEDICATIONS      diclofenac (VOLTAREN) 75 MG EC tablet TAKE 1 TABLET BY MOUTH TWICE DAILY 60 tablet 2     No  "current facility-administered medications on file prior to visit.       No Known Allergies  Past Medical History:   Diagnosis Date    AAA (abdominal aortic aneurysm) without rupture     Bursitis of hip     Hip arthrosis     Will be my second visit, was prescribed a medicine. That has not helped. Looking for next step, maybe injection.     Past Surgical History:   Procedure Laterality Date    ABDOMINAL AORTIC ANEURYSM REPAIR      VASECTOMY       Social History     Socioeconomic History    Marital status:    Tobacco Use    Smoking status: Every Day     Current packs/day: 0.00     Average packs/day: 1.5 packs/day for 51.7 years (77.6 ttl pk-yrs)     Types: Cigarettes     Start date: 1968     Last attempt to quit: 2019     Years since quittin.1    Smokeless tobacco: Never   Vaping Use    Vaping status: Never Used   Substance and Sexual Activity    Alcohol use: Never    Drug use: Never    Sexual activity: Yes     Partners: Female     Birth control/protection: None, Vasectomy     Family History   Problem Relation Age of Onset    No Known Problems Other     Diabetes Mother         Passed    Cancer Father         Passed       Objective   Physical Exam  Well appearing patient in no acute distress on RA  Anicteric sclerae, no rash on exposed skin, benign appearing SK on upper left chest  Respirations non-labored  Awake, alert, and oriented x 4. Speech intact. No neurologic deficit  Appropriate mood and affect      Vitals:    11/15/24 0832   BP: 153/88   Pulse: 74   Resp: 16   Temp: 97.3 °F (36.3 °C)   TempSrc: Temporal   SpO2: 94%   Weight: 90 kg (198 lb 6.6 oz)   Height: 177.8 cm (70\")   PainSc: 0-No pain                     PHQ-9 Total Score:             Result Review :   The following data was reviewed by: Clarence Rose MD on 11/15/24:  Lab Results   Component Value Date    HGB 15.6 2023    HCT 46.0 2023    MCV 97.9 (H) 2023     2023    WBC 12.22 " "(H) 11/07/2023    NEUTROABS 8.29 (H) 11/07/2023    LYMPHSABS 2.53 11/07/2023    MONOSABS 1.13 (H) 11/07/2023    EOSABS 0.21 11/07/2023    BASOSABS 0.04 11/07/2023     Lab Results   Component Value Date    GLUCOSE 98 11/07/2023    BUN 13 11/07/2023    CREATININE 1.00 11/13/2024     11/07/2023    K 3.7 11/07/2023     11/07/2023    CO2 28.9 11/07/2023    CALCIUM 9.2 11/07/2023    PROTEINTOT 6.9 11/07/2023    ALBUMIN 4.6 11/07/2023    BILITOT 0.4 11/07/2023    ALKPHOS 63 11/07/2023    AST 11 11/07/2023    ALT 13 11/07/2023     No results found for: \"MG\", \"PHOS\", \"FREET4\", \"TSH\"    Labs personally reviewed. CBC normal.     CEA level personally reviewed    5/2024 Jefferson positive    11/13/24 CT Abd/pelvis personally reviewed and no masses or lesions per my independent read. Aortic stent seen.    No radiology results for the last 30 days.            Assessment and Plan    Diagnoses and all orders for this visit:    1. Malignant neoplasm of ascending colon (Primary)  -     CEA; Future  -     CT chest w contrast; Future  -     CT abdomen pelvis w contrast; Future    2. Left hip pain  -     Ambulatory Referral to Orthopedic Surgery    3. Lung nodule      Stage II colon adenocarcinoma  High risk stage II. He is s/p right hemicolectomy on 1/30/23 with pathology showing [id number] with LVI and PNI. 0/21 lymph nodes involved. G3, poorly differentiated. Baseline CEA negative.  Due to high risk stage II colon cancer with the T4 stage as well LVI and PNI, adjuvant chemotherapy was recommended. Patient was not interested in IV chemo or getting a port, so he was started on single agent oral capecitabine. Patient was unable to tolerate this even after dose reduction with Cycle 2. He did not resume. Further chemotherapy held.     Will continue with surveillance. Baseline ctDNA with Jefferson completed 3/2/23 and negative. Repeat 4/2023 and 11/2023 also negative. Repeat 5/2024 was mildly positive, discussed with patient.  Repeat CT " staging 11/13/24 with several peritoneal metastases. CEA increased slightly. Refer to [facility] for consideration of surgery vs HIPEC vs systemic chemotherapy.      SEAN nodule  0.4 cm. Stable on recent scans 5/7/24. Stable since 3/2023. Most certainly benign. Repeat CT chest in 6 months per cancer surveillance as above.     Left hip pain  Refer to ortho.       Patient Follow Up: 3 months with scans  Patient was given instructions and counseling regarding his condition or for health maintenance advice. Please see specific information pulled into the AVS if appropriate.

## 2024-11-18 ENCOUNTER — PATIENT MESSAGE (OUTPATIENT)
Dept: ONCOLOGY | Facility: HOSPITAL | Age: 69
End: 2024-11-18
Payer: MEDICARE

## 2024-11-19 DIAGNOSIS — C18.2 MALIGNANT NEOPLASM OF ASCENDING COLON: Primary | ICD-10-CM

## 2025-02-21 ENCOUNTER — LAB (OUTPATIENT)
Dept: ONCOLOGY | Facility: HOSPITAL | Age: 70
End: 2025-02-21
Payer: MEDICARE

## 2025-02-24 ENCOUNTER — OFFICE VISIT (OUTPATIENT)
Dept: ONCOLOGY | Facility: HOSPITAL | Age: 70
End: 2025-02-24
Payer: MEDICARE

## 2025-02-24 VITALS
RESPIRATION RATE: 18 BRPM | BODY MASS INDEX: 30.11 KG/M2 | OXYGEN SATURATION: 98 % | TEMPERATURE: 98.3 F | SYSTOLIC BLOOD PRESSURE: 172 MMHG | HEIGHT: 70 IN | HEART RATE: 74 BPM | DIASTOLIC BLOOD PRESSURE: 80 MMHG | WEIGHT: 210.32 LBS

## 2025-02-24 DIAGNOSIS — C18.2 MALIGNANT NEOPLASM OF ASCENDING COLON: Primary | ICD-10-CM

## 2025-02-24 PROCEDURE — G0463 HOSPITAL OUTPT CLINIC VISIT: HCPCS | Performed by: INTERNAL MEDICINE

## 2025-02-24 PROCEDURE — 1126F AMNT PAIN NOTED NONE PRSNT: CPT | Performed by: INTERNAL MEDICINE

## 2025-02-24 PROCEDURE — 99214 OFFICE O/P EST MOD 30 MIN: CPT | Performed by: INTERNAL MEDICINE

## 2025-02-24 NOTE — PROGRESS NOTES
Chief Complaint   FOLLOW UP 2     Umer Antonio MD Preston, Steven H, MD    Subjective          Demetri Pratt presents to [facility] for right sided colon cancer    Mr. Pratt is a pleasant 68-year-old presenting with his wife for follow up of right-sided colon cancer.  He has a past medical history of AAA status post repair.  Patient met with Dr. Kapoor's surgical team at [facility].  Reports he is not interested in surgery or radiation.  He was due for repeat staging CT scans however he declined.  Patient is having some abdominal pain.  He also has some left hip pain.  Patient has a trip coming up.  He is not interested in chemotherapy.  He is interested in managing his symptoms and improving his quality of life.  He is due to meet with palliative care team tomorrow.    Oncology/Hematology History Overview Note   12/29/22: CTA of abdomen (prior history of AAA) demonstrated masslike circumferential wall thickening of the cecum and proximal ascending colon. CEA 4.2 (wnl)    1/30/23: Lap converted to open Right hemicolectomy with abdominal wall resection, omentectomy at the [facility]. 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  Tumor directly invades adjacent organ or structure: abdominal wall, no perforation, LVI and PNI both present.  All margins negative for invasive carcinoma. All 21 regional lymph nodes negative for tumor: Staged at pT4b pN0.     3/2/23: Jefferson (1st ctDNA test): negative    3/11/23: C1D1 Capecitabine dosed at 2000 mg in AM and 2000 mg in PM. Complicated by headache and gum and throat pain. Decrease to 1500 mg AM and 1500 mg PM on 3/17/23.     3/30/23: CT CAP with contrast: negative for disease/mets    4/7/23: Planned C2 1500 mg BID capecitabine start. Discontinued after 1.5 weeks due to side effects. Will not plan any  further dose reduction.     4/28/23: Jefferson negative    10/27/23: CT CAP: SILVINO. CEA 2.5       Malignant neoplasm of ascending colon   1/30/2023 Cancer Staged    Staging form: Colon And Rectum, AJCC 8th Edition  - Pathologic stage from 1/30/2023: Stage IIC (pT4b, pN0, cM0) - Signed by Clarence Rose MD on 2/28/2023 2/28/2023 Initial Diagnosis    Malignant neoplasm of ascending colon (HCC)     2/28/2023 -  Chemotherapy    OP COLORECTAL Capecitabine (Dose Selection Required) BID (8 cycles)           Review of Systems   Constitutional:  Negative for appetite change, diaphoresis, fatigue, fever, unexpected weight gain and unexpected weight loss.   HENT:  Negative for hearing loss, sore throat and voice change.    Eyes:  Negative for blurred vision, double vision, pain, redness and visual disturbance.   Respiratory:  Negative for cough, chest tightness, shortness of breath and wheezing.    Cardiovascular:  Negative for chest pain, palpitations and leg swelling.   Endocrine: Negative for cold intolerance, heat intolerance, polydipsia and polyuria.   Genitourinary:  Negative for decreased urine volume, difficulty urinating, frequency and urinary incontinence.   Musculoskeletal:  Positive for arthralgias. Negative for back pain, joint swelling and myalgias.   Skin:  Negative for color change, rash, skin lesions and wound.   Neurological:  Negative for dizziness, seizures, numbness and headache.   Hematological:  Negative for adenopathy. Does not bruise/bleed easily.   Psychiatric/Behavioral:  Negative for depressed mood. The patient is not nervous/anxious.    All other systems reviewed and are negative.    Current Outpatient Medications on File Prior to Visit   Medication Sig Dispense Refill    colestipol (COLESTID) 1 g tablet TAKE 1 TABLET BY MOUTH TWICE DAILY AFTER MEALS. TAKE AT LEAST 1 HOUR AFTER OR 4 HOURS BEFORE OTHER MEDICATIONS      diclofenac (VOLTAREN) 75 MG EC tablet TAKE 1 TABLET BY MOUTH TWICE DAILY  "60 tablet 2     No current facility-administered medications on file prior to visit.       No Known Allergies  Past Medical History:   Diagnosis Date    AAA (abdominal aortic aneurysm) without rupture     Bursitis of hip     Hip arthrosis     Will be my second visit, was prescribed a medicine. That has not helped. Looking for next step, maybe injection.     Past Surgical History:   Procedure Laterality Date    ABDOMINAL AORTIC ANEURYSM REPAIR      VASECTOMY       Social History     Socioeconomic History    Marital status:    Tobacco Use    Smoking status: Every Day     Current packs/day: 0.00     Average packs/day: 1.5 packs/day for 51.7 years (77.6 ttl pk-yrs)     Types: Cigarettes     Start date: 1968     Last attempt to quit: 2019     Years since quittin.4    Smokeless tobacco: Never   Vaping Use    Vaping status: Never Used   Substance and Sexual Activity    Alcohol use: Never    Drug use: Never    Sexual activity: Yes     Partners: Female     Birth control/protection: None, Vasectomy     Family History   Problem Relation Age of Onset    No Known Problems Other     Diabetes Mother         Passed    Cancer Father         Passed       Objective   Physical Exam  Well appearing patient in no acute distress on RA  Anicteric sclerae, no rash on exposed skin  Respirations non-labored  Awake, alert, and oriented x 4. Speech intact. No neurologic deficit  Appropriate mood and affect      Vitals:    25 0806   BP: 172/80   Pulse: 74   Resp: 18   Temp: 98.3 °F (36.8 °C)   TempSrc: Temporal   SpO2: 98%   Weight: 95.4 kg (210 lb 5.1 oz)   Height: 177.8 cm (70\")   PainSc: 0-No pain                       PHQ-9 Total Score:             Result Review :   The following data was reviewed by: Clarence Rose MD on 25:  Lab Results   Component Value Date    HGB 15.6 2023    HCT 46.0 2023    MCV 97.9 (H) 2023     2023    WBC 12.22 (H) 2023    " "NEUTROABS 8.29 (H) 11/07/2023    LYMPHSABS 2.53 11/07/2023    MONOSABS 1.13 (H) 11/07/2023    EOSABS 0.21 11/07/2023    BASOSABS 0.04 11/07/2023     Lab Results   Component Value Date    GLUCOSE 98 11/07/2023    BUN 13 11/07/2023    CREATININE 1.00 11/13/2024     11/07/2023    K 3.7 11/07/2023     11/07/2023    CO2 28.9 11/07/2023    CALCIUM 9.2 11/07/2023    PROTEINTOT 6.9 11/07/2023    ALBUMIN 4.6 11/07/2023    BILITOT 0.4 11/07/2023    ALKPHOS 63 11/07/2023    AST 11 11/07/2023    ALT 13 11/07/2023     No results found for: \"MG\", \"PHOS\", \"FREET4\", \"TSH\"    Labs personally reviewed. CBC normal.     5/2024 Jefferson positive    11/13/24 CT Abd/pelvis personally reviewed and no masses or lesions per my independent read. Aortic stent seen.    Outside oncology note personally reviewed      No radiology results for the last 30 days.            Assessment and Plan    Diagnoses and all orders for this visit:    1. Malignant neoplasm of ascending colon (Primary)        Stage IV colon adenocarcinoma  High risk stage II. He is s/p right hemicolectomy on 1/30/23 with pathology showing [id number] with LVI and PNI. 0/21 lymph nodes involved. G3, poorly differentiated. Baseline CEA negative.  Due to high risk stage II colon cancer with the T4 stage as well LVI and PNI, adjuvant chemotherapy was recommended. Patient was not interested in IV chemo or getting a port, so he was started on single agent oral capecitabine. Patient was unable to tolerate this even after dose reduction with Cycle 2. He did not resume. Further chemotherapy held.     Repeat ctDNA testing (Jefferson) 5/2024 was mildly positive, discussed with patient.  Repeat CT staging 11/13/24 with several peritoneal metastases. CEA increased slightly. Has been seen at  for consideration of surgery vs HIPEC vs systemic chemotherapy.  Patient declines further surgery or radiation.    2/24/25 update: Discussed with patient that he has stage IV or metastatic colon " cancer.  Discussed that this is unfortunately incurable but is treatable.  Treatment involves multiagent chemotherapy as patient declines further surgery.  Discussed that chemotherapy can be adjusted based off his tolerability.  Side effects briefly discussed. Prognosis discussed. Discussed alternative option of not proceeding with chemotherapy.  Discussed if this was the case, we would focus on his symptoms and his quality of life.  Patient at this time is not interested in chemotherapy due to concern of side effects and minimal benefit.  Patient due to meet with palliative care soon.  Patient can follow-up as needed with us for symptom management.    SEAN nodule  0.4 cm. Stable since 3/2023. Most certainly benign.      Left hip pain  Refer to ortho.       Total time spent: 30 minutes  Patient Follow Up: PRN  Patient was given instructions and counseling regarding his condition or for health maintenance advice. Please see specific information pulled into the AVS if appropriate.